# Patient Record
Sex: FEMALE | Race: OTHER | Employment: FULL TIME | URBAN - METROPOLITAN AREA
[De-identification: names, ages, dates, MRNs, and addresses within clinical notes are randomized per-mention and may not be internally consistent; named-entity substitution may affect disease eponyms.]

---

## 2017-01-28 RX ORDER — SIMVASTATIN 20 MG
TABLET ORAL
Qty: 30 TABLET | Refills: 0 | Status: SHIPPED | OUTPATIENT
Start: 2017-01-28 | End: 2017-03-03

## 2017-03-04 RX ORDER — SIMVASTATIN 20 MG
TABLET ORAL
Qty: 30 TABLET | Refills: 0 | Status: SHIPPED | OUTPATIENT
Start: 2017-03-04 | End: 2017-04-11

## 2017-03-23 ENCOUNTER — OFFICE VISIT (OUTPATIENT)
Dept: INTERNAL MEDICINE CLINIC | Facility: CLINIC | Age: 32
End: 2017-03-23

## 2017-03-23 VITALS
HEART RATE: 67 BPM | HEIGHT: 65 IN | BODY MASS INDEX: 34.16 KG/M2 | WEIGHT: 205 LBS | DIASTOLIC BLOOD PRESSURE: 86 MMHG | SYSTOLIC BLOOD PRESSURE: 137 MMHG

## 2017-03-23 DIAGNOSIS — R79.1 ABNORMAL INR: ICD-10-CM

## 2017-03-23 DIAGNOSIS — R51.9 BILATERAL HEADACHE: ICD-10-CM

## 2017-03-23 DIAGNOSIS — M25.511 ACUTE PAIN OF RIGHT SHOULDER: ICD-10-CM

## 2017-03-23 DIAGNOSIS — D68.59 ANTITHROMBIN III DEFICIENCY (HCC): ICD-10-CM

## 2017-03-23 DIAGNOSIS — G08 DURAL VENOUS SINUS THROMBOSIS: ICD-10-CM

## 2017-03-23 DIAGNOSIS — I63.00 CEREBROVASCULAR ACCIDENT (CVA) DUE TO THROMBOSIS OF PRECEREBRAL ARTERY (HCC): Primary | ICD-10-CM

## 2017-03-23 DIAGNOSIS — Z00.00 PREVENTATIVE HEALTH CARE: ICD-10-CM

## 2017-03-23 LAB — INR: 3.6 (ref 0.8–1.2)

## 2017-03-23 PROCEDURE — 36416 COLLJ CAPILLARY BLOOD SPEC: CPT | Performed by: INTERNAL MEDICINE

## 2017-03-23 PROCEDURE — 85610 PROTHROMBIN TIME: CPT | Performed by: INTERNAL MEDICINE

## 2017-03-23 PROCEDURE — 99214 OFFICE O/P EST MOD 30 MIN: CPT | Performed by: INTERNAL MEDICINE

## 2017-03-23 RX ORDER — MECLIZINE HCL 12.5 MG/1
12.5 TABLET ORAL
COMMUNITY
Start: 2016-01-07 | End: 2017-03-23

## 2017-03-24 NOTE — PROGRESS NOTES
HPI:    Patient ID: Eli Dominguez is a 32year old female. HPI  Patient comes in today for follow-up. She has been to [place] to her grandma's house after her father  last few months.   She says she has been checking her INR herself and her cousin w of age at time of stroke. Mother is currently 51 y/o. • Htn [Other] [OTHER] Paternal Grandmother    • DVT [Other] [OTHER] Paternal Aunt      unsure of age, also on her LEs.      • dvt [Other] [OTHER] Paternal Cousin Female 25     started after second  C dietitian  Bilateral headache-patient takes Topamax.   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

## 2017-03-27 ENCOUNTER — ANTI-COAG VISIT (OUTPATIENT)
Dept: INTERNAL MEDICINE CLINIC | Facility: CLINIC | Age: 32
End: 2017-03-27

## 2017-03-27 DIAGNOSIS — I63.00 CEREBROVASCULAR ACCIDENT (CVA) DUE TO THROMBOSIS OF PRECEREBRAL ARTERY (HCC): Primary | ICD-10-CM

## 2017-03-27 LAB — INR: 1.7 (ref 0.8–1.2)

## 2017-03-27 PROCEDURE — 36416 COLLJ CAPILLARY BLOOD SPEC: CPT | Performed by: INTERNAL MEDICINE

## 2017-03-27 PROCEDURE — 85610 PROTHROMBIN TIME: CPT | Performed by: INTERNAL MEDICINE

## 2017-04-04 ENCOUNTER — ANTI-COAG VISIT (OUTPATIENT)
Dept: INTERNAL MEDICINE CLINIC | Facility: CLINIC | Age: 32
End: 2017-04-04

## 2017-04-04 DIAGNOSIS — I63.89 CEREBROVASCULAR ACCIDENT (CVA) DUE TO OTHER MECHANISM (HCC): ICD-10-CM

## 2017-04-04 DIAGNOSIS — I63.39 CEREBROVASCULAR ACCIDENT (CVA) DUE TO THROMBOSIS OF OTHER CEREBRAL ARTERY (HCC): Primary | ICD-10-CM

## 2017-04-04 PROCEDURE — 85610 PROTHROMBIN TIME: CPT | Performed by: INTERNAL MEDICINE

## 2017-04-04 PROCEDURE — 36416 COLLJ CAPILLARY BLOOD SPEC: CPT | Performed by: INTERNAL MEDICINE

## 2017-04-04 NOTE — PROGRESS NOTES
Message left for pt with new coumadin dose as requested, pt informed needs to recheck level on 4-11-17 and requested pt to call and let us know she received this message.

## 2017-04-07 RX ORDER — WARFARIN SODIUM 1 MG/1
TABLET ORAL
Qty: 30 TABLET | Refills: 0 | Status: SHIPPED | OUTPATIENT
Start: 2017-04-07 | End: 2019-09-16

## 2017-04-10 RX ORDER — WARFARIN SODIUM 5 MG/1
TABLET ORAL
Qty: 30 TABLET | Refills: 1 | Status: SHIPPED | OUTPATIENT
Start: 2017-04-10 | End: 2017-07-19

## 2017-04-11 ENCOUNTER — ANTI-COAG VISIT (OUTPATIENT)
Dept: INTERNAL MEDICINE CLINIC | Facility: CLINIC | Age: 32
End: 2017-04-11

## 2017-04-11 DIAGNOSIS — I63.89 CEREBROVASCULAR ACCIDENT (CVA) DUE TO OTHER MECHANISM (HCC): Primary | ICD-10-CM

## 2017-04-11 PROCEDURE — 85610 PROTHROMBIN TIME: CPT | Performed by: INTERNAL MEDICINE

## 2017-04-11 PROCEDURE — 36416 COLLJ CAPILLARY BLOOD SPEC: CPT | Performed by: INTERNAL MEDICINE

## 2017-04-12 RX ORDER — SIMVASTATIN 20 MG
TABLET ORAL
Qty: 30 TABLET | Refills: 3 | Status: SHIPPED | OUTPATIENT
Start: 2017-04-12 | End: 2017-08-30

## 2017-04-12 NOTE — PROGRESS NOTES
Called patient and was routed to voicemail. Left message on voicemail for patient to return office phone call, and request to speak to Marty Christian.

## 2017-04-18 ENCOUNTER — ANTI-COAG VISIT (OUTPATIENT)
Dept: INTERNAL MEDICINE CLINIC | Facility: CLINIC | Age: 32
End: 2017-04-18

## 2017-04-18 DIAGNOSIS — I63.89 CEREBROVASCULAR ACCIDENT (CVA) DUE TO OTHER MECHANISM (HCC): Primary | ICD-10-CM

## 2017-04-18 PROCEDURE — 85610 PROTHROMBIN TIME: CPT | Performed by: INTERNAL MEDICINE

## 2017-04-18 PROCEDURE — 36416 COLLJ CAPILLARY BLOOD SPEC: CPT | Performed by: INTERNAL MEDICINE

## 2017-04-25 ENCOUNTER — ANTI-COAG VISIT (OUTPATIENT)
Dept: INTERNAL MEDICINE CLINIC | Facility: CLINIC | Age: 32
End: 2017-04-25

## 2017-04-25 DIAGNOSIS — I63.89 CEREBROVASCULAR ACCIDENT (CVA) DUE TO OTHER MECHANISM (HCC): Primary | ICD-10-CM

## 2017-04-25 PROCEDURE — 85610 PROTHROMBIN TIME: CPT | Performed by: INTERNAL MEDICINE

## 2017-04-25 PROCEDURE — 36416 COLLJ CAPILLARY BLOOD SPEC: CPT | Performed by: INTERNAL MEDICINE

## 2017-04-25 NOTE — PROGRESS NOTES
Left voice message for patient with dosing instructions and date of next INR testing. Left office number for patient to return call if any questions and to schedule next INR visit.

## 2017-05-12 RX ORDER — TOPIRAMATE 100 MG/1
TABLET, FILM COATED ORAL
Qty: 60 TABLET | Refills: 0 | OUTPATIENT
Start: 2017-05-12

## 2017-05-23 ENCOUNTER — ANTI-COAG VISIT (OUTPATIENT)
Dept: INTERNAL MEDICINE CLINIC | Facility: CLINIC | Age: 32
End: 2017-05-23

## 2017-05-23 DIAGNOSIS — I63.89 CEREBROVASCULAR ACCIDENT (CVA) DUE TO OTHER MECHANISM (HCC): Primary | ICD-10-CM

## 2017-05-23 PROCEDURE — 36416 COLLJ CAPILLARY BLOOD SPEC: CPT | Performed by: INTERNAL MEDICINE

## 2017-05-23 PROCEDURE — 85610 PROTHROMBIN TIME: CPT | Performed by: INTERNAL MEDICINE

## 2017-06-22 ENCOUNTER — OFFICE VISIT (OUTPATIENT)
Dept: INTERNAL MEDICINE CLINIC | Facility: CLINIC | Age: 32
End: 2017-06-22

## 2017-06-22 VITALS
HEART RATE: 71 BPM | SYSTOLIC BLOOD PRESSURE: 119 MMHG | BODY MASS INDEX: 33.82 KG/M2 | HEIGHT: 65 IN | DIASTOLIC BLOOD PRESSURE: 83 MMHG | WEIGHT: 203 LBS

## 2017-06-22 DIAGNOSIS — I63.89 CEREBROVASCULAR ACCIDENT (CVA) DUE TO OTHER MECHANISM (HCC): Primary | ICD-10-CM

## 2017-06-22 DIAGNOSIS — R23.8 EASY BRUISABILITY: ICD-10-CM

## 2017-06-22 DIAGNOSIS — E78.5 HYPERLIPIDEMIA, UNSPECIFIED HYPERLIPIDEMIA TYPE: ICD-10-CM

## 2017-06-22 DIAGNOSIS — R04.0 EPISTAXIS: ICD-10-CM

## 2017-06-22 DIAGNOSIS — R79.1 ELEVATED INR: ICD-10-CM

## 2017-06-22 DIAGNOSIS — G08 DURAL VENOUS SINUS THROMBOSIS: ICD-10-CM

## 2017-06-22 PROCEDURE — 85610 PROTHROMBIN TIME: CPT | Performed by: INTERNAL MEDICINE

## 2017-06-22 PROCEDURE — 36416 COLLJ CAPILLARY BLOOD SPEC: CPT | Performed by: INTERNAL MEDICINE

## 2017-06-22 PROCEDURE — 99214 OFFICE O/P EST MOD 30 MIN: CPT | Performed by: INTERNAL MEDICINE

## 2017-06-22 PROCEDURE — 36415 COLL VENOUS BLD VENIPUNCTURE: CPT | Performed by: INTERNAL MEDICINE

## 2017-06-22 RX ORDER — TOPIRAMATE 100 MG/1
100 TABLET, FILM COATED ORAL 2 TIMES DAILY
Qty: 60 TABLET | Refills: 2 | Status: SHIPPED | OUTPATIENT
Start: 2017-06-22 | End: 2020-02-18

## 2017-06-22 NOTE — PROGRESS NOTES
HPI:    Patient ID: Josh Cooper is a 32year old female. HPI   Pt comes in for follow up, pt has been having nose bleeds last week also easy bruising, pt also for INR check and its high. She has not followed wit ob or neuro.      Review of Systems   Co Status: Never Used                        Alcohol Use: Yes           0.0 oz/week       0 Standard drinks or equivalent per week       Comment: 1-2 drinks every other month on average.        PHYSICAL EXAM:    Physical Exam   Constitutional: She is oriented

## 2017-06-26 ENCOUNTER — TELEPHONE (OUTPATIENT)
Dept: INTERNAL MEDICINE CLINIC | Facility: CLINIC | Age: 32
End: 2017-06-26

## 2017-06-26 ENCOUNTER — ANTI-COAG VISIT (OUTPATIENT)
Dept: INTERNAL MEDICINE CLINIC | Facility: CLINIC | Age: 32
End: 2017-06-26

## 2017-06-26 DIAGNOSIS — I63.9 CEREBROVASCULAR ACCIDENT (CVA), UNSPECIFIED MECHANISM (HCC): Primary | ICD-10-CM

## 2017-06-26 DIAGNOSIS — I63.89 CEREBROVASCULAR ACCIDENT (CVA) DUE TO OTHER MECHANISM (HCC): ICD-10-CM

## 2017-06-26 PROCEDURE — 36416 COLLJ CAPILLARY BLOOD SPEC: CPT | Performed by: INTERNAL MEDICINE

## 2017-06-26 PROCEDURE — 85610 PROTHROMBIN TIME: CPT | Performed by: INTERNAL MEDICINE

## 2017-06-26 NOTE — TELEPHONE ENCOUNTER
Patient will be traveling via airplane mid July, asking if there are any medicines she should take for her headaches, any precautions?

## 2017-07-05 ENCOUNTER — ANTI-COAG VISIT (OUTPATIENT)
Dept: INTERNAL MEDICINE CLINIC | Facility: CLINIC | Age: 32
End: 2017-07-05

## 2017-07-05 DIAGNOSIS — I63.89 CEREBROVASCULAR ACCIDENT (CVA) DUE TO OTHER MECHANISM (HCC): ICD-10-CM

## 2017-07-05 LAB
INR: 1.4 (ref 0.8–1.2)
TEST STRIP LOT #: ABNORMAL NUMERIC

## 2017-07-05 PROCEDURE — 36416 COLLJ CAPILLARY BLOOD SPEC: CPT | Performed by: INTERNAL MEDICINE

## 2017-07-05 PROCEDURE — 85610 PROTHROMBIN TIME: CPT | Performed by: INTERNAL MEDICINE

## 2017-07-13 ENCOUNTER — ANTI-COAG VISIT (OUTPATIENT)
Dept: INTERNAL MEDICINE CLINIC | Facility: CLINIC | Age: 32
End: 2017-07-13

## 2017-07-13 DIAGNOSIS — I63.89 CEREBROVASCULAR ACCIDENT (CVA) DUE TO OTHER MECHANISM (HCC): ICD-10-CM

## 2017-07-13 LAB — INR: 1.3 (ref 0.8–1.2)

## 2017-07-13 PROCEDURE — 85610 PROTHROMBIN TIME: CPT

## 2017-07-13 PROCEDURE — 36416 COLLJ CAPILLARY BLOOD SPEC: CPT | Performed by: INTERNAL MEDICINE

## 2017-07-13 PROCEDURE — 36416 COLLJ CAPILLARY BLOOD SPEC: CPT

## 2017-07-14 NOTE — PROGRESS NOTES
Left detailed message (HIPPA verified), informed of Dr. Charlee Sosa message as shown below. Provided Coumadin Clinic number. Also left office number if any questions.      MD Mely Mariscal Yk Clinical Staff 15 hours ago (6:14 PM)      I dont th

## 2017-07-17 ENCOUNTER — TELEPHONE (OUTPATIENT)
Dept: INTERNAL MEDICINE CLINIC | Facility: CLINIC | Age: 32
End: 2017-07-17

## 2017-07-17 NOTE — TELEPHONE ENCOUNTER
Per patient will like to know since she has been taking 5mg thurs- Sunday if there is any instructions you can give her in the mean time. Per patient she work 7am-5:30pm and is not sure how soon she will be seen at the coumadin clinic.  Patient states since

## 2017-07-17 NOTE — TELEPHONE ENCOUNTER
Patient calling states regarding INR isntructions, informed patient detailed message was left last week Friday. Per patient never received call, per patient since she was not given instructions has been taking 5mg every day.  Informed patient per PCP should

## 2017-07-17 NOTE — TELEPHONE ENCOUNTER
I think it will be best to have the clinic manage her INR because we are having hard time getting the INR to where it needs to be

## 2017-07-18 NOTE — TELEPHONE ENCOUNTER
Patient scheduled appointment for tomorrow 7/19/17 at 5:15pm. Patient states has been taking 5 mg daily will like to know for tonight will you like her to take anything or hold until tomorrow?  Please advise

## 2017-07-18 NOTE — TELEPHONE ENCOUNTER
Can you send me her latest INR as a result so I can see the coumadin table to see what dose she was taking

## 2017-07-19 ENCOUNTER — ANTI-COAG VISIT (OUTPATIENT)
Dept: INTERNAL MEDICINE CLINIC | Facility: CLINIC | Age: 32
End: 2017-07-19

## 2017-07-19 DIAGNOSIS — I63.89 CEREBROVASCULAR ACCIDENT (CVA) DUE TO OTHER MECHANISM (HCC): ICD-10-CM

## 2017-07-19 LAB — INR: 1.8 (ref 0.8–1.2)

## 2017-07-19 PROCEDURE — 36416 COLLJ CAPILLARY BLOOD SPEC: CPT | Performed by: INTERNAL MEDICINE

## 2017-07-19 PROCEDURE — 85610 PROTHROMBIN TIME: CPT | Performed by: INTERNAL MEDICINE

## 2017-07-19 NOTE — PROGRESS NOTES
Coumadin dosing instructions were discussed with patient. Dosing instructions were read back to me accurately. Patient to return in  Two days for recheck of INR.    Appointment scheduled

## 2017-07-20 RX ORDER — WARFARIN SODIUM 5 MG/1
TABLET ORAL
Qty: 30 TABLET | Refills: 0 | Status: SHIPPED | OUTPATIENT
Start: 2017-07-20 | End: 2017-08-30

## 2017-07-21 ENCOUNTER — TELEPHONE (OUTPATIENT)
Dept: INTERNAL MEDICINE CLINIC | Facility: CLINIC | Age: 32
End: 2017-07-21

## 2017-07-21 DIAGNOSIS — D68.59 ANTITHROMBIN III DEFICIENCY (HCC): Primary | ICD-10-CM

## 2017-07-21 DIAGNOSIS — I63.89 CEREBROVASCULAR ACCIDENT (CVA) DUE TO OTHER MECHANISM (HCC): ICD-10-CM

## 2017-07-21 NOTE — TELEPHONE ENCOUNTER
Left message on voice mail if patient would come back for INR on Saturday, may go to coumadin clinic or may go to [facility]. Please call to let us know what she decided office number left on voice mail.

## 2017-07-21 NOTE — TELEPHONE ENCOUNTER
Informed Dr. Juan R Ramsey that Lewis Ordonezsins came during work hrs for INR. At that time Melisa Abdirashid was having problems with the INR machine and was trying to fix it. Tried to inform pt of the issue but she had left already.  Per Dr. Juan R Ramsey pt should continue with same dosage on

## 2017-07-21 NOTE — TELEPHONE ENCOUNTER
I called pt but no response , pt was supposed to get an INR today but never did, this is recurrent with pt .  Pt is very non complaint with treatment and INR checks and that is why I want her to follow at the clonic with the INR and not with us

## 2017-08-09 ENCOUNTER — TELEPHONE (OUTPATIENT)
Dept: INTERNAL MEDICINE CLINIC | Facility: CLINIC | Age: 32
End: 2017-08-09

## 2017-08-09 DIAGNOSIS — I63.00 CEREBROVASCULAR ACCIDENT (CVA) DUE TO THROMBOSIS OF PRECEREBRAL ARTERY (HCC): Primary | ICD-10-CM

## 2017-08-09 NOTE — TELEPHONE ENCOUNTER
Pt would like to schedule an appt to see the coumadin clinic. Pt states that she was referred by Dr. Link Mckeon.

## 2017-08-10 DIAGNOSIS — I63.9 CEREBROVASCULAR ACCIDENT (CVA), UNSPECIFIED MECHANISM (HCC): Primary | ICD-10-CM

## 2017-08-10 NOTE — TELEPHONE ENCOUNTER
Dr. Monica Nj the coumadin clinic states that they cannot take a lab order, it needs to go under orders or referral made out to [facility]. Please disregard previous order.

## 2017-08-11 ENCOUNTER — ANTI-COAG VISIT (OUTPATIENT)
Dept: INTERNAL MEDICINE CLINIC | Facility: CLINIC | Age: 32
End: 2017-08-11

## 2017-08-11 DIAGNOSIS — I63.00 CEREBROVASCULAR ACCIDENT (CVA) DUE TO THROMBOSIS OF PRECEREBRAL ARTERY (HCC): ICD-10-CM

## 2017-08-14 ENCOUNTER — ANTI-COAG VISIT (OUTPATIENT)
Dept: INTERNAL MEDICINE CLINIC | Facility: CLINIC | Age: 32
End: 2017-08-14

## 2017-08-14 ENCOUNTER — TELEPHONE (OUTPATIENT)
Dept: CARDIOLOGY CLINIC | Facility: CLINIC | Age: 32
End: 2017-08-14

## 2017-08-14 DIAGNOSIS — I63.00 CEREBROVASCULAR ACCIDENT (CVA) DUE TO THROMBOSIS OF PRECEREBRAL ARTERY (HCC): Primary | ICD-10-CM

## 2017-08-14 LAB — INR: 3.4 (ref 2–3)

## 2017-08-14 PROCEDURE — 99211 OFF/OP EST MAY X REQ PHY/QHP: CPT

## 2017-08-14 PROCEDURE — 85610 PROTHROMBIN TIME: CPT

## 2017-08-14 PROCEDURE — 36416 COLLJ CAPILLARY BLOOD SPEC: CPT

## 2017-08-14 NOTE — TELEPHONE ENCOUNTER
Anil Blair,    Pt here at the coumadin clinic today, She was taking 5 mg daily. She had INR 3.4. I gave her new instruction on taking 2.5 mg on Monday and continue 5 mg all other day, which is decreasing 7.1 % weekly. Return in 2 weeks.      Please adv

## 2017-08-30 ENCOUNTER — ANTI-COAG VISIT (OUTPATIENT)
Dept: INTERNAL MEDICINE CLINIC | Facility: CLINIC | Age: 32
End: 2017-08-30

## 2017-08-30 DIAGNOSIS — I63.00 CEREBROVASCULAR ACCIDENT (CVA) DUE TO THROMBOSIS OF PRECEREBRAL ARTERY (HCC): ICD-10-CM

## 2017-08-30 LAB — INR: 2.4 (ref 2–3)

## 2017-08-30 PROCEDURE — 85610 PROTHROMBIN TIME: CPT

## 2017-08-30 PROCEDURE — 36416 COLLJ CAPILLARY BLOOD SPEC: CPT

## 2017-08-31 RX ORDER — SIMVASTATIN 20 MG
TABLET ORAL
Qty: 30 TABLET | Refills: 1 | Status: SHIPPED | OUTPATIENT
Start: 2017-08-31 | End: 2018-03-11

## 2017-08-31 RX ORDER — WARFARIN SODIUM 5 MG/1
TABLET ORAL
Qty: 30 TABLET | Refills: 1 | Status: SHIPPED | OUTPATIENT
Start: 2017-08-31 | End: 2017-11-15

## 2017-09-28 ENCOUNTER — ANTI-COAG VISIT (OUTPATIENT)
Dept: INTERNAL MEDICINE CLINIC | Facility: CLINIC | Age: 32
End: 2017-09-28

## 2017-09-28 DIAGNOSIS — I63.00 CEREBROVASCULAR ACCIDENT (CVA) DUE TO THROMBOSIS OF PRECEREBRAL ARTERY (HCC): ICD-10-CM

## 2017-09-28 LAB — INR: 2.6 (ref 2–3)

## 2017-09-28 PROCEDURE — 85610 PROTHROMBIN TIME: CPT

## 2017-09-28 PROCEDURE — 36416 COLLJ CAPILLARY BLOOD SPEC: CPT

## 2017-10-25 ENCOUNTER — ANTI-COAG VISIT (OUTPATIENT)
Dept: INTERNAL MEDICINE CLINIC | Facility: CLINIC | Age: 32
End: 2017-10-25

## 2017-10-25 DIAGNOSIS — I63.00 CEREBROVASCULAR ACCIDENT (CVA) DUE TO THROMBOSIS OF PRECEREBRAL ARTERY (HCC): ICD-10-CM

## 2017-10-25 PROCEDURE — 85610 PROTHROMBIN TIME: CPT

## 2017-10-25 PROCEDURE — 99211 OFF/OP EST MAY X REQ PHY/QHP: CPT

## 2017-10-25 PROCEDURE — 36416 COLLJ CAPILLARY BLOOD SPEC: CPT

## 2017-11-15 RX ORDER — WARFARIN SODIUM 5 MG/1
TABLET ORAL
Qty: 30 TABLET | Refills: 1 | Status: SHIPPED | OUTPATIENT
Start: 2017-11-15 | End: 2018-01-27

## 2017-12-04 ENCOUNTER — TELEPHONE (OUTPATIENT)
Dept: FAMILY MEDICINE CLINIC | Facility: CLINIC | Age: 32
End: 2017-12-04

## 2017-12-04 DIAGNOSIS — I63.00 CEREBROVASCULAR ACCIDENT (CVA) DUE TO THROMBOSIS OF PRECEREBRAL ARTERY (HCC): Primary | ICD-10-CM

## 2017-12-05 ENCOUNTER — APPOINTMENT (OUTPATIENT)
Dept: LAB | Facility: HOSPITAL | Age: 32
End: 2017-12-05
Attending: INTERNAL MEDICINE
Payer: COMMERCIAL

## 2017-12-05 DIAGNOSIS — I63.89 CEREBROVASCULAR ACCIDENT (CVA) DUE TO OTHER MECHANISM (HCC): ICD-10-CM

## 2017-12-05 DIAGNOSIS — I63.9 CEREBROVASCULAR ACCIDENT (CVA), UNSPECIFIED MECHANISM (HCC): ICD-10-CM

## 2017-12-05 DIAGNOSIS — D68.59 ANTITHROMBIN III DEFICIENCY (HCC): ICD-10-CM

## 2017-12-05 PROCEDURE — 85610 PROTHROMBIN TIME: CPT

## 2017-12-05 PROCEDURE — 36415 COLL VENOUS BLD VENIPUNCTURE: CPT

## 2017-12-06 ENCOUNTER — TELEPHONE (OUTPATIENT)
Dept: INTERNAL MEDICINE CLINIC | Facility: CLINIC | Age: 32
End: 2017-12-06

## 2017-12-06 NOTE — TELEPHONE ENCOUNTER
PCP received INR result for patient. Patient is supposed to have her INR managed by coumadin clinic. Spoke with patient, informed to call coumadin clinic so she could receive instructions for her dosage.  Patient verbalized understanding, phone number given

## 2017-12-11 ENCOUNTER — TELEPHONE (OUTPATIENT)
Dept: INTERNAL MEDICINE CLINIC | Facility: CLINIC | Age: 32
End: 2017-12-11

## 2017-12-11 ENCOUNTER — ANTI-COAG VISIT (OUTPATIENT)
Dept: INTERNAL MEDICINE CLINIC | Facility: CLINIC | Age: 32
End: 2017-12-11

## 2017-12-11 DIAGNOSIS — I63.00 CEREBROVASCULAR ACCIDENT (CVA) DUE TO THROMBOSIS OF PRECEREBRAL ARTERY (HCC): ICD-10-CM

## 2017-12-11 NOTE — TELEPHONE ENCOUNTER
Pt calling states she had her INR done at the lab, pt requesting call back with orders. Pt requesting if no answer please leave detailed VM.

## 2017-12-19 ENCOUNTER — ANTI-COAG VISIT (OUTPATIENT)
Dept: INTERNAL MEDICINE CLINIC | Facility: CLINIC | Age: 32
End: 2017-12-19

## 2017-12-19 DIAGNOSIS — I63.00 CEREBROVASCULAR ACCIDENT (CVA) DUE TO THROMBOSIS OF PRECEREBRAL ARTERY (HCC): ICD-10-CM

## 2017-12-19 PROCEDURE — 85610 PROTHROMBIN TIME: CPT

## 2017-12-19 PROCEDURE — 36416 COLLJ CAPILLARY BLOOD SPEC: CPT

## 2018-01-16 ENCOUNTER — ANTI-COAG VISIT (OUTPATIENT)
Dept: INTERNAL MEDICINE CLINIC | Facility: CLINIC | Age: 33
End: 2018-01-16

## 2018-01-16 DIAGNOSIS — I63.00 CEREBROVASCULAR ACCIDENT (CVA) DUE TO THROMBOSIS OF PRECEREBRAL ARTERY (HCC): ICD-10-CM

## 2018-01-16 LAB — INR: 2.2 (ref 2–3)

## 2018-01-16 PROCEDURE — 85610 PROTHROMBIN TIME: CPT

## 2018-01-16 PROCEDURE — 93793 ANTICOAG MGMT PT WARFARIN: CPT

## 2018-01-23 ENCOUNTER — OFFICE VISIT (OUTPATIENT)
Dept: INTERNAL MEDICINE CLINIC | Facility: CLINIC | Age: 33
End: 2018-01-23

## 2018-01-23 VITALS
BODY MASS INDEX: 31.49 KG/M2 | HEIGHT: 65 IN | WEIGHT: 189 LBS | OXYGEN SATURATION: 95 % | HEART RATE: 87 BPM | TEMPERATURE: 100 F | SYSTOLIC BLOOD PRESSURE: 131 MMHG | DIASTOLIC BLOOD PRESSURE: 88 MMHG

## 2018-01-23 DIAGNOSIS — R68.89 FLU-LIKE SYMPTOMS: Primary | ICD-10-CM

## 2018-01-23 DIAGNOSIS — L60.8 DISCOLORATION AND THICKENING OF NAILS BOTH FEET: ICD-10-CM

## 2018-01-23 LAB
FLUAV + FLUBV RNA SPEC NAA+PROBE: NEGATIVE

## 2018-01-23 PROCEDURE — 99213 OFFICE O/P EST LOW 20 MIN: CPT | Performed by: INTERNAL MEDICINE

## 2018-01-23 PROCEDURE — 99212 OFFICE O/P EST SF 10 MIN: CPT | Performed by: INTERNAL MEDICINE

## 2018-01-23 NOTE — PROGRESS NOTES
HPI:    Patient ID: Han Wilhelm is a 28year old female. HPI  Pt comes in with complaint of fatigue body ache fever congestion cough never got a flu shot this year.   Patient also complains of bilateral big toenail color discoloration is been going on and vitals reviewed.              ASSESSMENT/PLAN:   Flu-like symptoms  (primary encounter diagnosis)- will test for flu, if + will treat , also drink plenty of fluid, rest and as need tylenol   Discoloration and thickening of nails both feet- this is most

## 2018-01-23 NOTE — PATIENT INSTRUCTIONS
ASSESSMENT/PLAN:   Flu-like symptoms  (primary encounter diagnosis)- will test for flu, if + will treat , also drink plenty of fluid, rest and as need tylenol   Discoloration and thickening of nails both feet- this is most likely to an old trauma with po

## 2018-01-29 RX ORDER — WARFARIN SODIUM 5 MG/1
TABLET ORAL
Qty: 45 TABLET | Refills: 0 | Status: SHIPPED | OUTPATIENT
Start: 2018-01-29 | End: 2018-04-09

## 2018-03-12 RX ORDER — SIMVASTATIN 20 MG
TABLET ORAL
Qty: 30 TABLET | Refills: 0 | Status: SHIPPED | OUTPATIENT
Start: 2018-03-12 | End: 2018-04-09

## 2018-04-10 RX ORDER — WARFARIN SODIUM 5 MG/1
TABLET ORAL
Qty: 45 TABLET | Refills: 0 | Status: SHIPPED | OUTPATIENT
Start: 2018-04-10 | End: 2018-06-14

## 2018-04-10 RX ORDER — SIMVASTATIN 20 MG
TABLET ORAL
Qty: 30 TABLET | Refills: 0 | Status: SHIPPED | OUTPATIENT
Start: 2018-04-10 | End: 2018-06-14

## 2018-06-15 RX ORDER — WARFARIN SODIUM 5 MG/1
TABLET ORAL
Qty: 45 TABLET | Refills: 0 | Status: SHIPPED | OUTPATIENT
Start: 2018-06-15 | End: 2018-08-13

## 2018-06-15 RX ORDER — SIMVASTATIN 20 MG
TABLET ORAL
Qty: 30 TABLET | Refills: 0 | Status: SHIPPED | OUTPATIENT
Start: 2018-06-15 | End: 2020-02-18

## 2018-07-30 ENCOUNTER — APPOINTMENT (OUTPATIENT)
Dept: HEMATOLOGY/ONCOLOGY | Facility: HOSPITAL | Age: 33
End: 2018-07-30
Attending: NURSE PRACTITIONER
Payer: COMMERCIAL

## 2018-07-31 ENCOUNTER — APPOINTMENT (OUTPATIENT)
Dept: HEMATOLOGY/ONCOLOGY | Facility: HOSPITAL | Age: 33
End: 2018-07-31
Attending: NURSE PRACTITIONER
Payer: COMMERCIAL

## 2018-08-13 RX ORDER — WARFARIN SODIUM 5 MG/1
TABLET ORAL
Qty: 45 TABLET | Refills: 0 | Status: SHIPPED | OUTPATIENT
Start: 2018-08-13 | End: 2019-09-16

## 2018-08-22 ENCOUNTER — TELEPHONE (OUTPATIENT)
Dept: HEMATOLOGY/ONCOLOGY | Facility: HOSPITAL | Age: 33
End: 2018-08-22

## 2018-08-22 ENCOUNTER — APPOINTMENT (OUTPATIENT)
Dept: HEMATOLOGY/ONCOLOGY | Facility: HOSPITAL | Age: 33
End: 2018-08-22
Payer: COMMERCIAL

## 2018-08-22 NOTE — TELEPHONE ENCOUNTER
I called to confirm Vanda's appointment for 8/23. She states \" I want to cancel this appointment and not reschedule at this time. \"

## 2018-08-23 ENCOUNTER — APPOINTMENT (OUTPATIENT)
Dept: HEMATOLOGY/ONCOLOGY | Facility: HOSPITAL | Age: 33
End: 2018-08-23
Attending: INTERNAL MEDICINE
Payer: COMMERCIAL

## 2019-06-12 ENCOUNTER — TELEPHONE (OUTPATIENT)
Dept: INTERNAL MEDICINE CLINIC | Facility: CLINIC | Age: 34
End: 2019-06-12

## 2019-06-12 PROBLEM — I63.00 CEREBROVASCULAR ACCIDENT (CVA) DUE TO THROMBOSIS OF PRECEREBRAL ARTERY (HCC): Status: RESOLVED | Noted: 2017-08-11 | Resolved: 2019-06-12

## 2019-08-27 ENCOUNTER — HOSPITAL ENCOUNTER (OUTPATIENT)
Age: 34
Discharge: ANOTHER HOSPITAL | End: 2019-08-27
Attending: EMERGENCY MEDICINE
Payer: COMMERCIAL

## 2019-08-27 ENCOUNTER — HOSPITAL ENCOUNTER (EMERGENCY)
Facility: HOSPITAL | Age: 34
Discharge: HOME | End: 2019-08-27
Attending: PHYSICIAN ASSISTANT
Payer: COMMERCIAL

## 2019-08-27 ENCOUNTER — APPOINTMENT (OUTPATIENT)
Dept: CT IMAGING | Facility: HOSPITAL | Age: 34
End: 2019-08-27
Attending: PHYSICIAN ASSISTANT
Payer: COMMERCIAL

## 2019-08-27 VITALS
RESPIRATION RATE: 17 BRPM | HEIGHT: 65 IN | TEMPERATURE: 98 F | WEIGHT: 165 LBS | BODY MASS INDEX: 27.49 KG/M2 | SYSTOLIC BLOOD PRESSURE: 123 MMHG | DIASTOLIC BLOOD PRESSURE: 75 MMHG | OXYGEN SATURATION: 98 % | HEART RATE: 66 BPM

## 2019-08-27 VITALS
HEIGHT: 65 IN | WEIGHT: 190 LBS | BODY MASS INDEX: 31.65 KG/M2 | TEMPERATURE: 98 F | RESPIRATION RATE: 18 BRPM | DIASTOLIC BLOOD PRESSURE: 87 MMHG | SYSTOLIC BLOOD PRESSURE: 125 MMHG | HEART RATE: 68 BPM | OXYGEN SATURATION: 100 %

## 2019-08-27 DIAGNOSIS — R51.9 NONINTRACTABLE HEADACHE, UNSPECIFIED CHRONICITY PATTERN, UNSPECIFIED HEADACHE TYPE: Primary | ICD-10-CM

## 2019-08-27 DIAGNOSIS — R19.7 DIARRHEA, UNSPECIFIED TYPE: ICD-10-CM

## 2019-08-27 DIAGNOSIS — R10.9 ABDOMINAL PAIN, LEFT LATERAL: ICD-10-CM

## 2019-08-27 DIAGNOSIS — R09.81 NASAL CONGESTION: ICD-10-CM

## 2019-08-27 DIAGNOSIS — R51.9 NEW ONSET HEADACHE: Primary | ICD-10-CM

## 2019-08-27 LAB
ANION GAP SERPL CALC-SCNC: 6 MMOL/L (ref 0–18)
APTT PPP: 19.3 SECONDS (ref 23.2–35.3)
B-HCG UR QL: NEGATIVE
BASOPHILS # BLD AUTO: 0.06 X10(3) UL (ref 0–0.2)
BASOPHILS NFR BLD AUTO: 0.7 %
BILIRUB UR QL: NEGATIVE
BUN BLD-MCNC: 5 MG/DL (ref 7–18)
BUN/CREAT SERPL: 6.6 (ref 10–20)
CALCIUM BLD-MCNC: 9.1 MG/DL (ref 8.5–10.1)
CHLORIDE SERPL-SCNC: 106 MMOL/L (ref 98–112)
CO2 SERPL-SCNC: 29 MMOL/L (ref 21–32)
COLOR UR: YELLOW
CREAT BLD-MCNC: 0.76 MG/DL (ref 0.55–1.02)
DEPRECATED RDW RBC AUTO: 45.6 FL (ref 35.1–46.3)
EOSINOPHIL # BLD AUTO: 0.44 X10(3) UL (ref 0–0.7)
EOSINOPHIL NFR BLD AUTO: 5 %
ERYTHROCYTE [DISTWIDTH] IN BLOOD BY AUTOMATED COUNT: 13.2 % (ref 11–15)
GLUCOSE BLD-MCNC: 97 MG/DL (ref 70–99)
GLUCOSE UR-MCNC: NEGATIVE MG/DL
HCT VFR BLD AUTO: 43.8 % (ref 35–48)
HGB BLD-MCNC: 14.5 G/DL (ref 12–16)
HGB UR QL STRIP.AUTO: NEGATIVE
IMM GRANULOCYTES # BLD AUTO: 0.02 X10(3) UL (ref 0–1)
IMM GRANULOCYTES NFR BLD: 0.2 %
INR BLD: 0.93 (ref 0.9–1.2)
KETONES UR-MCNC: NEGATIVE MG/DL
LEUKOCYTE ESTERASE UR QL STRIP.AUTO: NEGATIVE
LYMPHOCYTES # BLD AUTO: 2.13 X10(3) UL (ref 1–4)
LYMPHOCYTES NFR BLD AUTO: 24.3 %
MCH RBC QN AUTO: 31 PG (ref 26–34)
MCHC RBC AUTO-ENTMCNC: 33.1 G/DL (ref 31–37)
MCV RBC AUTO: 93.6 FL (ref 80–100)
MONOCYTES # BLD AUTO: 0.52 X10(3) UL (ref 0.1–1)
MONOCYTES NFR BLD AUTO: 5.9 %
NEUTROPHILS # BLD AUTO: 5.59 X10 (3) UL (ref 1.5–7.7)
NEUTROPHILS # BLD AUTO: 5.59 X10(3) UL (ref 1.5–7.7)
NEUTROPHILS NFR BLD AUTO: 63.9 %
NITRITE UR QL STRIP.AUTO: NEGATIVE
OSMOLALITY SERPL CALC.SUM OF ELEC: 289 MOSM/KG (ref 275–295)
PH UR: 8 [PH] (ref 5–8)
PLATELET # BLD AUTO: 263 10(3)UL (ref 150–450)
POTASSIUM SERPL-SCNC: 4 MMOL/L (ref 3.5–5.1)
PROT UR-MCNC: NEGATIVE MG/DL
PROTHROMBIN TIME: 12.3 SECONDS (ref 11.8–14.5)
RBC # BLD AUTO: 4.68 X10(6)UL (ref 3.8–5.3)
RBC #/AREA URNS AUTO: 1 /HPF
SODIUM SERPL-SCNC: 141 MMOL/L (ref 136–145)
SP GR UR STRIP: 1.02 (ref 1–1.03)
UROBILINOGEN UR STRIP-ACNC: <2
VIT C UR-MCNC: NEGATIVE MG/DL
WBC # BLD AUTO: 8.8 X10(3) UL (ref 4–11)
WBC #/AREA URNS AUTO: <1 /HPF

## 2019-08-27 PROCEDURE — 96375 TX/PRO/DX INJ NEW DRUG ADDON: CPT

## 2019-08-27 PROCEDURE — 85730 THROMBOPLASTIN TIME PARTIAL: CPT | Performed by: PHYSICIAN ASSISTANT

## 2019-08-27 PROCEDURE — 80048 BASIC METABOLIC PNL TOTAL CA: CPT | Performed by: PHYSICIAN ASSISTANT

## 2019-08-27 PROCEDURE — 70470 CT HEAD/BRAIN W/O & W/DYE: CPT | Performed by: PHYSICIAN ASSISTANT

## 2019-08-27 PROCEDURE — 85610 PROTHROMBIN TIME: CPT | Performed by: PHYSICIAN ASSISTANT

## 2019-08-27 PROCEDURE — 99213 OFFICE O/P EST LOW 20 MIN: CPT

## 2019-08-27 PROCEDURE — 99284 EMERGENCY DEPT VISIT MOD MDM: CPT

## 2019-08-27 PROCEDURE — 85025 COMPLETE CBC W/AUTO DIFF WBC: CPT | Performed by: PHYSICIAN ASSISTANT

## 2019-08-27 PROCEDURE — 96374 THER/PROPH/DIAG INJ IV PUSH: CPT

## 2019-08-27 PROCEDURE — 81025 URINE PREGNANCY TEST: CPT

## 2019-08-27 PROCEDURE — 99212 OFFICE O/P EST SF 10 MIN: CPT

## 2019-08-27 PROCEDURE — 96361 HYDRATE IV INFUSION ADD-ON: CPT

## 2019-08-27 PROCEDURE — 81003 URINALYSIS AUTO W/O SCOPE: CPT | Performed by: PHYSICIAN ASSISTANT

## 2019-08-27 RX ORDER — DEXAMETHASONE SODIUM PHOSPHATE 4 MG/ML
10 VIAL (ML) INJECTION ONCE
Status: COMPLETED | OUTPATIENT
Start: 2019-08-27 | End: 2019-08-27

## 2019-08-27 RX ORDER — ONDANSETRON 4 MG/1
4 TABLET, ORALLY DISINTEGRATING ORAL EVERY 6 HOURS PRN
Qty: 10 TABLET | Refills: 0 | Status: SHIPPED | OUTPATIENT
Start: 2019-08-27 | End: 2019-08-30

## 2019-08-27 RX ORDER — KETOROLAC TROMETHAMINE 30 MG/ML
30 INJECTION, SOLUTION INTRAMUSCULAR; INTRAVENOUS ONCE
Status: COMPLETED | OUTPATIENT
Start: 2019-08-27 | End: 2019-08-27

## 2019-08-27 RX ORDER — FLUTICASONE PROPIONATE 50 MCG
2 SPRAY, SUSPENSION (ML) NASAL DAILY
Qty: 16 G | Refills: 0 | Status: SHIPPED | OUTPATIENT
Start: 2019-08-27 | End: 2019-09-26

## 2019-08-27 RX ORDER — ONDANSETRON 2 MG/ML
4 INJECTION INTRAMUSCULAR; INTRAVENOUS ONCE
Status: COMPLETED | OUTPATIENT
Start: 2019-08-27 | End: 2019-08-27

## 2019-08-27 NOTE — ED PROVIDER NOTES
Patient Seen in: [facility] CLINICS Immediate Care In [address] [place] [address]    History   Patient presents with:  Headache (neurologic)    Stated Complaint: diarrhea,congestion    HPI    Patient complains of headaches for the past 2 months and on and off diarrhea and a Unsure of age at time of stroke. Mother is currently 53 y/o. • Other (Htn [Other]) Paternal Grandmother    • Other (DVT [Other]) Paternal Aunt         unsure of age, also on her LEs.      • Other (dvt [Other]) Paternal Cousin Female 22        sta No data to display    MDM       Cardiac Monitor: Pulse Readings from Last 1 Encounters:  08/27/19 : 68  , ,      Radiology findings:       Procedures:      Critical Care:        MDM   Given her history and her discontinuation of meds and new onset of a hea

## 2019-08-27 NOTE — ED INITIAL ASSESSMENT (HPI)
C/o nasal congestion and headaches for 2 months  C/o on and off diarrhea and abd pain for 1 1/2 months  Claims that she is taking care of her aunt who has C-diff a month ago

## 2019-08-27 NOTE — ED PROVIDER NOTES
Patient Seen in: [facility] AND [facility] Emergency Department    History   Patient presents with:  Headache (neurologic)  Nausea/Vomiting/Diarrhea (gastrointestinal)    Stated Complaint: from [place]    HPI    Mariama Castro is a 29year old female who presents COUMADIN CLINIC   SIMVASTATIN 20 MG Oral Tab,  TAKE 1 TABLET BY MOUTH EVERY NIGHT. topiramate 100 MG Oral Tab,  Take 1 tablet (100 mg total) by mouth 2 (two) times daily. WARFARIN SODIUM 1 MG Oral Tab,  TAKE 1 TABLET BY MOUTH NIGHTLY.  SEE INSTRUCTIONS abnormalities of mood, affect. Head: Normocephalic/atraumatic. Nontender. Eyes: Pupils are equal round reactive to light. Conjunctiva are within normal limits. Extraocular motions intact bilaterally. ENT: Oropharynx is clear.   TMs within normal limit components:    PTT 19.3 (*)     All other components within normal limits   PROTHROMBIN TIME (PT) - Normal   EMH POCT PREGNANCY URINE - Normal   CBC WITH DIFFERENTIAL WITH PLATELET    Narrative:      The following orders were created for panel order CBC WIT [facility]  [place] [address] [phone]    Schedule an appointment as soon as possible for a visit in 2 days  For follow-up    Alvina Millan, [phone]    Schedule an appointment as soon as possible for a visit

## 2019-08-27 NOTE — ED NOTES
Pt to ER from [address] [place] [address] with multiple complaints. Pt states she has been having a worsening frontal headache for 2 months. Pt states intermittent visual changes. Pt states she wears contacts. Pt denies fevers at home. Pt c/o nasal congestion. Pt denies cough.  Pt

## 2019-08-27 NOTE — ED INITIAL ASSESSMENT (HPI)
Referred to ER from [facility] for further eval. C/o L sided abdominal pain everyday for 2 months with intermittent bouts of diarrhea. 4 loose foul-smelling stools today.  Ernesto Moreno is caring for her Aunt who is terminally ill - her Walt Score was recently diagnose

## 2019-08-28 ENCOUNTER — OFFICE VISIT (OUTPATIENT)
Dept: INTERNAL MEDICINE CLINIC | Facility: CLINIC | Age: 34
End: 2019-08-28
Payer: COMMERCIAL

## 2019-08-28 VITALS
HEIGHT: 65 IN | BODY MASS INDEX: 28.99 KG/M2 | SYSTOLIC BLOOD PRESSURE: 117 MMHG | DIASTOLIC BLOOD PRESSURE: 77 MMHG | RESPIRATION RATE: 18 BRPM | HEART RATE: 85 BPM | TEMPERATURE: 99 F | OXYGEN SATURATION: 99 % | WEIGHT: 174 LBS

## 2019-08-28 DIAGNOSIS — Z09 ENCOUNTER FOR EXAMINATION FOLLOWING TREATMENT AT HOSPITAL: Primary | ICD-10-CM

## 2019-08-28 DIAGNOSIS — R10.84 GENERALIZED ABDOMINAL PAIN: ICD-10-CM

## 2019-08-28 DIAGNOSIS — I63.9 CEREBROVASCULAR ACCIDENT (CVA), UNSPECIFIED MECHANISM (HCC): ICD-10-CM

## 2019-08-28 DIAGNOSIS — G08 THROMBOSIS TRANSVERSE SINUS: ICD-10-CM

## 2019-08-28 DIAGNOSIS — R51.9 BILATERAL HEADACHE: ICD-10-CM

## 2019-08-28 PROCEDURE — 99214 OFFICE O/P EST MOD 30 MIN: CPT | Performed by: INTERNAL MEDICINE

## 2019-08-28 RX ORDER — DICYCLOMINE HYDROCHLORIDE 10 MG/1
10 CAPSULE ORAL 4 TIMES DAILY
Qty: 40 CAPSULE | Refills: 3 | Status: SHIPPED | OUTPATIENT
Start: 2019-08-28 | End: 2019-09-07

## 2019-08-28 NOTE — PROGRESS NOTES
HPI:    Patient ID: Chapo Garcia is a 29year old female.     HPI  She comes in for follow-up after she was seen in ER for headaches and abdominal pain patient has stopped taking the Coumadin has not taken out for a while she says she is been getting more treatment      Past Surgical History:   Procedure Laterality Date   • APPENDECTOMY  approx. 2006   • LIPOMA REMOVAL        Family History   Problem Relation Age of Onset   • Diabetes Father    • Stroke Mother         Deon Maynard of age at time of stroke.   Dar abdominal pain with irritable bowel syndrome we will give Bentyl will follow with GI  Noncompliant with medical treatment she has been taking Coumadin long time. No orders of the defined types were placed in this encounter.       Meds This Visit:  Reques

## 2019-08-29 ENCOUNTER — OFFICE VISIT (OUTPATIENT)
Dept: OTOLARYNGOLOGY | Facility: CLINIC | Age: 34
End: 2019-08-29
Payer: COMMERCIAL

## 2019-08-29 VITALS
SYSTOLIC BLOOD PRESSURE: 119 MMHG | DIASTOLIC BLOOD PRESSURE: 80 MMHG | WEIGHT: 173 LBS | BODY MASS INDEX: 28.82 KG/M2 | HEIGHT: 65 IN | TEMPERATURE: 98 F

## 2019-08-29 DIAGNOSIS — J30.89 NON-SEASONAL ALLERGIC RHINITIS, UNSPECIFIED TRIGGER: Primary | ICD-10-CM

## 2019-08-29 PROCEDURE — 99243 OFF/OP CNSLTJ NEW/EST LOW 30: CPT | Performed by: OTOLARYNGOLOGY

## 2019-08-30 NOTE — PROGRESS NOTES
Em Severino is a 29year old female. Patient presents with:  Nose Problem: nasal congestion for several years   Headache: daily     HPI:   She has been experiencing problems with recurrent sinus congestion and nasal pressure.   She had a stroke in the pas month on average.     Drug use: Not Currently       REVIEW OF SYSTEMS:   GENERAL HEALTH: feels well otherwise  GENERAL : denies fever, chills, sweats, weight loss, weight gain  SKIN: denies any unusual skin lesions or rashes  RESPIRATORY: denies shortness o MD  8/30/2019  6:23 AM

## 2019-09-05 NOTE — H&P
[address] [address] Gastroenterology                                                                                                  Clinic History and Physical     Pa issues with sedation.  - No known history of sleep apnea. Pertinent Family Hx:  + liver cirrhosis, maternal grandfather   - No family hx of esophageal, gastric or colon cancer  - No family history of IBD.     Prior endoscopies:  March 2016 EGD/colonoscop 2 sprays by Nasal route daily.  Disp: 16 g Rfl: 0   WARFARIN SODIUM 5 MG Oral Tab TAKE 1 AND 1/2 TABLETS BY MOUTH ON MONDAY, 1 TABLET ON THE REST OF THE WEEK AS DIRECTED BY THE COUMADIN CLINIC Disp: 45 tablet Rfl: 0   SIMVASTATIN 20 MG Oral Tab TAKE 1 TABLE extremities   Psych: Pt has a normal mood and affect, behavior is normal    Nursing note and vitals reviewed    Labs/Imaging:     Patient's labs and imaging were reviewed and discussed with patient today. See HPI and A&P for further details.      .  ASSESS above      Orders This Visit:  Orders Placed This Encounter      Immunoglobulin A, Qn, Serum (IGA)      Tissue Transglutaminase Ab, IgA      TSH (Assay, Thyroid Stim Hormone)      Lipase      Hepatic Function Panel (7)      C. diff toxigenic PCR (OPT)

## 2019-09-12 ENCOUNTER — OFFICE VISIT (OUTPATIENT)
Dept: GASTROENTEROLOGY | Facility: CLINIC | Age: 34
End: 2019-09-12
Payer: COMMERCIAL

## 2019-09-12 VITALS
WEIGHT: 175 LBS | HEART RATE: 76 BPM | SYSTOLIC BLOOD PRESSURE: 122 MMHG | HEIGHT: 65 IN | DIASTOLIC BLOOD PRESSURE: 82 MMHG | BODY MASS INDEX: 29.16 KG/M2

## 2019-09-12 DIAGNOSIS — R19.8 IRREGULAR BOWEL HABITS: ICD-10-CM

## 2019-09-12 DIAGNOSIS — R10.84 GENERALIZED ABDOMINAL PAIN: Primary | ICD-10-CM

## 2019-09-12 PROCEDURE — 99243 OFF/OP CNSLTJ NEW/EST LOW 30: CPT | Performed by: NURSE PRACTITIONER

## 2019-09-12 NOTE — PATIENT INSTRUCTIONS
1.  Complete blood work and stool testing  2. Complete CT scan  3.   Follow-up depending on lab findings

## 2019-09-16 ENCOUNTER — OFFICE VISIT (OUTPATIENT)
Dept: HEMATOLOGY/ONCOLOGY | Facility: HOSPITAL | Age: 34
End: 2019-09-16
Attending: INTERNAL MEDICINE
Payer: COMMERCIAL

## 2019-09-16 ENCOUNTER — TELEPHONE (OUTPATIENT)
Dept: INTERNAL MEDICINE CLINIC | Facility: CLINIC | Age: 34
End: 2019-09-16

## 2019-09-16 ENCOUNTER — LAB ENCOUNTER (OUTPATIENT)
Dept: LAB | Facility: HOSPITAL | Age: 34
End: 2019-09-16
Attending: NURSE PRACTITIONER
Payer: COMMERCIAL

## 2019-09-16 VITALS
WEIGHT: 175 LBS | OXYGEN SATURATION: 100 % | RESPIRATION RATE: 16 BRPM | SYSTOLIC BLOOD PRESSURE: 118 MMHG | HEART RATE: 69 BPM | BODY MASS INDEX: 29.16 KG/M2 | HEIGHT: 65 IN | TEMPERATURE: 98 F | DIASTOLIC BLOOD PRESSURE: 71 MMHG

## 2019-09-16 DIAGNOSIS — Z51.81 ENCOUNTER FOR THERAPEUTIC DRUG MONITORING: Primary | ICD-10-CM

## 2019-09-16 DIAGNOSIS — R10.84 GENERALIZED ABDOMINAL PAIN: ICD-10-CM

## 2019-09-16 DIAGNOSIS — D68.59 HYPERCOAGULABLE STATE (HCC): Primary | ICD-10-CM

## 2019-09-16 DIAGNOSIS — Z79.01 LONG TERM (CURRENT) USE OF ANTICOAGULANTS: ICD-10-CM

## 2019-09-16 DIAGNOSIS — D68.59 HYPERCOAGULABLE STATE (HCC): ICD-10-CM

## 2019-09-16 DIAGNOSIS — R19.8 IRREGULAR BOWEL HABITS: ICD-10-CM

## 2019-09-16 LAB
ALBUMIN SERPL-MCNC: 3.5 G/DL (ref 3.4–5)
ALP LIVER SERPL-CCNC: 70 U/L (ref 37–98)
ALT SERPL-CCNC: 17 U/L (ref 13–56)
AST SERPL-CCNC: 16 U/L (ref 15–37)
BILIRUB DIRECT SERPL-MCNC: 0.1 MG/DL (ref 0–0.2)
BILIRUB SERPL-MCNC: 0.4 MG/DL (ref 0.1–2)
IGA SERPL-MCNC: 250 MG/DL (ref 70–312)
LIPASE SERPL-CCNC: 93 U/L (ref 73–393)
M PROTEIN MFR SERPL ELPH: 7 G/DL (ref 6.4–8.2)
TSI SER-ACNC: 2.79 MIU/ML (ref 0.36–3.74)

## 2019-09-16 PROCEDURE — 86146 BETA-2 GLYCOPROTEIN ANTIBODY: CPT

## 2019-09-16 PROCEDURE — 84443 ASSAY THYROID STIM HORMONE: CPT

## 2019-09-16 PROCEDURE — 85613 RUSSELL VIPER VENOM DILUTED: CPT

## 2019-09-16 PROCEDURE — 85390 FIBRINOLYSINS SCREEN I&R: CPT

## 2019-09-16 PROCEDURE — 85598 HEXAGNAL PHOSPH PLTLT NEUTRL: CPT

## 2019-09-16 PROCEDURE — 83516 IMMUNOASSAY NONANTIBODY: CPT

## 2019-09-16 PROCEDURE — 80076 HEPATIC FUNCTION PANEL: CPT

## 2019-09-16 PROCEDURE — 99214 OFFICE O/P EST MOD 30 MIN: CPT | Performed by: INTERNAL MEDICINE

## 2019-09-16 PROCEDURE — 85610 PROTHROMBIN TIME: CPT

## 2019-09-16 PROCEDURE — 82784 ASSAY IGA/IGD/IGG/IGM EACH: CPT | Performed by: NURSE PRACTITIONER

## 2019-09-16 PROCEDURE — 85730 THROMBOPLASTIN TIME PARTIAL: CPT

## 2019-09-16 PROCEDURE — 36415 COLL VENOUS BLD VENIPUNCTURE: CPT

## 2019-09-16 PROCEDURE — 86147 CARDIOLIPIN ANTIBODY EA IG: CPT

## 2019-09-16 PROCEDURE — 83690 ASSAY OF LIPASE: CPT

## 2019-09-16 RX ORDER — WARFARIN SODIUM 5 MG/1
TABLET ORAL
Qty: 45 TABLET | Refills: 0 | Status: SHIPPED | OUTPATIENT
Start: 2019-09-16 | End: 2019-11-01

## 2019-09-16 RX ORDER — WARFARIN SODIUM 1 MG/1
TABLET ORAL
Qty: 30 TABLET | Refills: 0 | Status: SHIPPED | OUTPATIENT
Start: 2019-09-16 | End: 2019-11-01

## 2019-09-16 RX ORDER — ENOXAPARIN SODIUM 100 MG/ML
1 INJECTION SUBCUTANEOUS 2 TIMES DAILY
Qty: 20 SYRINGE | Refills: 0 | Status: SHIPPED | OUTPATIENT
Start: 2019-09-16 | End: 2020-08-11

## 2019-09-16 NOTE — PROGRESS NOTES
ROYAL     Chapo Garcia is a 29year old female here for f/u of a Hypercoagulable state (hcc)  (primary encounter diagnosis)   Patient last seen in 2016. Seen recently in ER with c/o headaches. Hx CVA. Has not been taking coumadin for some time.    First tablet Rfl: 2   WARFARIN SODIUM 5 MG Oral Tab TAKE 1 AND 1/2 TABLETS BY MOUTH ON MONDAY, 1 TABLET ON THE REST OF THE WEEK AS DIRECTED BY THE COUMADIN CLINIC Disp: 45 tablet Rfl: 0   WARFARIN SODIUM 1 MG Oral Tab TAKE 1 TABLET BY MOUTH NIGHTLY.  SEE INSTRUCT rhythm. Abdomen: Soft, non tender with good bowel sounds. Extremities: No edema.           ASSESSMENT/PLAN:   Hypercoagulable state (hcc)  (primary encounter diagnosis)     Discussed with the patient that given the evidence of her MRI of prior CVA, I tiera to stop coumadin 3-4 days prior to procedure and start on lovenox bridge at the time and then through out pregnancy. Dose of lovenox should be therapeutic dose of 1 mg/kg sc bid.   Prescription sent today for this and coumadin and she needs to set up eric

## 2019-09-16 NOTE — TELEPHONE ENCOUNTER
Dr. Clarissa Pitt is requetsing patient to go back on warfarin and wants her monitored.  Order pended, please complete instructions and sign    History of:   Lupus  Hypercoagulable state  DVT  CVA QTc 470

## 2019-09-18 LAB
APTT PPP: 25.2 SECONDS (ref 23.2–35.3)
CONFIRM APTT STACLOT: NEGATIVE
CONFIRM DRVVT: 0.9 S (ref 0–1.1)
PROTHROMBIN TIME: 12.5 SECONDS (ref 11.8–14.5)
TTG IGA SER-ACNC: 0.5 U/ML (ref ?–7)

## 2019-09-19 ENCOUNTER — ANTI-COAG VISIT (OUTPATIENT)
Dept: INTERNAL MEDICINE CLINIC | Facility: CLINIC | Age: 34
End: 2019-09-19
Payer: COMMERCIAL

## 2019-09-19 DIAGNOSIS — G08 DURAL VENOUS SINUS THROMBOSIS: ICD-10-CM

## 2019-09-19 DIAGNOSIS — Z79.01 LONG TERM (CURRENT) USE OF ANTICOAGULANTS: ICD-10-CM

## 2019-09-19 DIAGNOSIS — Z51.81 ENCOUNTER FOR THERAPEUTIC DRUG MONITORING: ICD-10-CM

## 2019-09-19 DIAGNOSIS — G08 THROMBOSIS TRANSVERSE SINUS: ICD-10-CM

## 2019-09-19 DIAGNOSIS — D68.59 HYPERCOAGULABLE STATE (HCC): ICD-10-CM

## 2019-09-19 DIAGNOSIS — I63.019 CEREBROVASCULAR ACCIDENT (CVA) DUE TO THROMBOSIS OF VERTEBRAL ARTERY, UNSPECIFIED BLOOD VESSEL LATERALITY (HCC): ICD-10-CM

## 2019-09-19 LAB
CARDIOLIPIN IGG SERPL-ACNC: 1.5 GPL (ref 0–14.9)
CARDIOLIPIN IGM SERPL-ACNC: 5 MPL (ref 0–12.4)
INR: 1.7 (ref 2–3)

## 2019-09-19 PROCEDURE — 36416 COLLJ CAPILLARY BLOOD SPEC: CPT

## 2019-09-19 PROCEDURE — 85610 PROTHROMBIN TIME: CPT

## 2019-09-19 PROCEDURE — 93793 ANTICOAG MGMT PT WARFARIN: CPT

## 2019-09-20 LAB
BETA 2 GLYCOPROTEIN 1 AB, IGG: 6.4 U/ML (ref ?–15)
BETA 2 GLYCOPROTEIN 1 AB, IGM: 6.1 U/ML (ref ?–15)

## 2019-09-23 ENCOUNTER — TELEPHONE (OUTPATIENT)
Dept: ALLERGY | Facility: CLINIC | Age: 34
End: 2019-09-23

## 2019-09-23 ENCOUNTER — OFFICE VISIT (OUTPATIENT)
Dept: ALLERGY | Facility: CLINIC | Age: 34
End: 2019-09-23
Payer: COMMERCIAL

## 2019-09-23 ENCOUNTER — ANTI-COAG VISIT (OUTPATIENT)
Dept: INTERNAL MEDICINE CLINIC | Facility: CLINIC | Age: 34
End: 2019-09-23
Payer: COMMERCIAL

## 2019-09-23 ENCOUNTER — NURSE ONLY (OUTPATIENT)
Dept: ALLERGY | Facility: CLINIC | Age: 34
End: 2019-09-23
Payer: COMMERCIAL

## 2019-09-23 VITALS
HEART RATE: 83 BPM | TEMPERATURE: 98 F | RESPIRATION RATE: 18 BRPM | DIASTOLIC BLOOD PRESSURE: 79 MMHG | OXYGEN SATURATION: 98 % | SYSTOLIC BLOOD PRESSURE: 112 MMHG

## 2019-09-23 DIAGNOSIS — Z79.01 LONG TERM (CURRENT) USE OF ANTICOAGULANTS: ICD-10-CM

## 2019-09-23 DIAGNOSIS — J30.89 PERENNIAL ALLERGIC RHINITIS WITH SEASONAL VARIATION: Primary | ICD-10-CM

## 2019-09-23 DIAGNOSIS — Z51.81 ENCOUNTER FOR THERAPEUTIC DRUG MONITORING: ICD-10-CM

## 2019-09-23 DIAGNOSIS — G08 DURAL VENOUS SINUS THROMBOSIS: ICD-10-CM

## 2019-09-23 DIAGNOSIS — G08 THROMBOSIS TRANSVERSE SINUS: ICD-10-CM

## 2019-09-23 DIAGNOSIS — I63.019 CEREBROVASCULAR ACCIDENT (CVA) DUE TO THROMBOSIS OF VERTEBRAL ARTERY, UNSPECIFIED BLOOD VESSEL LATERALITY (HCC): ICD-10-CM

## 2019-09-23 DIAGNOSIS — J30.89 ENVIRONMENTAL AND SEASONAL ALLERGIES: ICD-10-CM

## 2019-09-23 DIAGNOSIS — J30.2 PERENNIAL ALLERGIC RHINITIS WITH SEASONAL VARIATION: Primary | ICD-10-CM

## 2019-09-23 DIAGNOSIS — D68.59 HYPERCOAGULABLE STATE (HCC): ICD-10-CM

## 2019-09-23 LAB — INR: 3.3 (ref 0.8–1.2)

## 2019-09-23 PROCEDURE — 85610 PROTHROMBIN TIME: CPT

## 2019-09-23 PROCEDURE — 95004 PERQ TESTS W/ALRGNC XTRCS: CPT | Performed by: ALLERGY & IMMUNOLOGY

## 2019-09-23 PROCEDURE — 95024 IQ TESTS W/ALLERGENIC XTRCS: CPT | Performed by: ALLERGY & IMMUNOLOGY

## 2019-09-23 PROCEDURE — 36416 COLLJ CAPILLARY BLOOD SPEC: CPT

## 2019-09-23 PROCEDURE — 93793 ANTICOAG MGMT PT WARFARIN: CPT

## 2019-09-23 PROCEDURE — 99244 OFF/OP CNSLTJ NEW/EST MOD 40: CPT | Performed by: ALLERGY & IMMUNOLOGY

## 2019-09-23 RX ORDER — LEVOCETIRIZINE DIHYDROCHLORIDE 5 MG/1
5 TABLET, FILM COATED ORAL NIGHTLY
Qty: 30 TABLET | Refills: 0 | Status: SHIPPED | OUTPATIENT
Start: 2019-09-23 | End: 2021-02-22

## 2019-09-23 RX ORDER — MONTELUKAST SODIUM 10 MG/1
10 TABLET ORAL NIGHTLY
Qty: 30 TABLET | Refills: 0 | Status: SHIPPED | OUTPATIENT
Start: 2019-09-23 | End: 2019-10-07

## 2019-09-23 NOTE — PATIENT INSTRUCTIONS
1. AR  Handouts on allergies and avoidance measures provided and reviewed including the potential treatment option of immunotherapy. Treatment plan reviewed.     Advised to use Flonase 2 sprays per nostril on a regular basis every day and may take up a we

## 2019-09-23 NOTE — PROGRESS NOTES
Jose Martin Nuñez is a 29year old female. HPI:   Patient presents with: Allergies: Patient reports that everyday she has nasal congestion especially at night, watery eyes, coughing and sneezing. Also has post nasal drip.      Patient is a 75-year-old femal Grandmother    • Other (DVT) Paternal Aunt         unsure of age, also on her LEs. • Other (dvt) Paternal Cousin Female 25        started after second  C section on the LE, and then had after 3rd C section. Had on miscarriage not sure what trimester. dysuria and hematuria  Hema/Lymph:  Negative for easy bleeding and easy bruising  Integumentary:  Negative for pruritus and rash  Musculoskeletal:  Negative for joint symptoms  Neurological:  Negative for dizziness, seizures  Psychiatric:  Negative for javon regular basis every day and may take up a week to take full effect add Singulair, montelukast 10 mg once a day in 1 week if refractory  Consider adding Xyzal 5 mg once night at bedtime as an antihistamine if refractory to Singulair  May add  sinus rinses a

## 2019-09-24 NOTE — TELEPHONE ENCOUNTER
Yes please proceed with prior authorization.   If not covered then have patient buy  xyzal over-the-counter

## 2019-09-24 NOTE — TELEPHONE ENCOUNTER
PA request for Xyzal received via fax. Prior medications include Claritin, Allergra, and Zyrtec. Jerry:  [id number]    Dr. John Farrell, would you like for us to proceed with this one? Thank you.

## 2019-09-24 NOTE — TELEPHONE ENCOUNTER
PA paper form filled out and signed by Dr. Ashlee Liang. Form faxed with OV from 9/23/19 to [facility] at [phone] with verification received of successful transmission. Will await response.

## 2019-09-26 NOTE — TELEPHONE ENCOUNTER
Response of PA request received via fax from [facility] . . . \"Levocetirizine is a product that is available OTC without a prescription. OTC products are excluded from your pharmacy benefit plan\".     Covered oral antihistamine Deslor

## 2019-09-26 NOTE — TELEPHONE ENCOUNTER
Message left on pt's mobile voicemail informing her that insurance denied prior auth for Xyzal due to the medication being available OTC. Pt informed to purchase xyzal (Levocetrizine) 5 mg tab, take one tab prior to bed nightly.      Pt asked to call th

## 2019-10-07 ENCOUNTER — OFFICE VISIT (OUTPATIENT)
Dept: ALLERGY | Facility: CLINIC | Age: 34
End: 2019-10-07
Payer: COMMERCIAL

## 2019-10-07 ENCOUNTER — ANTI-COAG VISIT (OUTPATIENT)
Dept: INTERNAL MEDICINE CLINIC | Facility: CLINIC | Age: 34
End: 2019-10-07
Payer: COMMERCIAL

## 2019-10-07 VITALS
RESPIRATION RATE: 18 BRPM | OXYGEN SATURATION: 98 % | SYSTOLIC BLOOD PRESSURE: 124 MMHG | DIASTOLIC BLOOD PRESSURE: 85 MMHG | HEART RATE: 73 BPM | TEMPERATURE: 99 F

## 2019-10-07 DIAGNOSIS — Z79.01 LONG TERM (CURRENT) USE OF ANTICOAGULANTS: ICD-10-CM

## 2019-10-07 DIAGNOSIS — J30.2 PERENNIAL ALLERGIC RHINITIS WITH SEASONAL VARIATION: ICD-10-CM

## 2019-10-07 DIAGNOSIS — Z51.81 ENCOUNTER FOR THERAPEUTIC DRUG MONITORING: ICD-10-CM

## 2019-10-07 DIAGNOSIS — J30.89 ENVIRONMENTAL AND SEASONAL ALLERGIES: Primary | ICD-10-CM

## 2019-10-07 DIAGNOSIS — I63.019 CEREBROVASCULAR ACCIDENT (CVA) DUE TO THROMBOSIS OF VERTEBRAL ARTERY, UNSPECIFIED BLOOD VESSEL LATERALITY (HCC): ICD-10-CM

## 2019-10-07 DIAGNOSIS — G08 DURAL VENOUS SINUS THROMBOSIS: ICD-10-CM

## 2019-10-07 DIAGNOSIS — J30.89 PERENNIAL ALLERGIC RHINITIS WITH SEASONAL VARIATION: ICD-10-CM

## 2019-10-07 DIAGNOSIS — G08 THROMBOSIS TRANSVERSE SINUS: ICD-10-CM

## 2019-10-07 DIAGNOSIS — D68.59 HYPERCOAGULABLE STATE (HCC): ICD-10-CM

## 2019-10-07 PROCEDURE — 99214 OFFICE O/P EST MOD 30 MIN: CPT | Performed by: ALLERGY & IMMUNOLOGY

## 2019-10-07 PROCEDURE — 93793 ANTICOAG MGMT PT WARFARIN: CPT | Performed by: INTERNAL MEDICINE

## 2019-10-07 RX ORDER — FLUTICASONE PROPIONATE 50 MCG
SPRAY, SUSPENSION (ML) NASAL DAILY
COMMUNITY

## 2019-10-07 RX ORDER — MONTELUKAST SODIUM 10 MG/1
10 TABLET ORAL NIGHTLY
Qty: 90 TABLET | Refills: 1 | Status: SHIPPED | OUTPATIENT
Start: 2019-10-07 | End: 2020-03-18

## 2019-10-07 NOTE — PATIENT INSTRUCTIONS
Recs:   Continue with Singulair 10 mg once a day  Continue with Flonase 2 sprays per nostril once a day  Trial of Xyzal, levocetirizine 5 mg once night at bedtime as an antihistamine  Reviewed avoidance measures and potential treatment option of immunother

## 2019-10-07 NOTE — PROGRESS NOTES
Michelle Horne is a 29year old female. HPI:   Patient presents with: Allergies: 2 week follow up--patient reports she is feeling a little better and is thinking about possibly doing allergy shots.  Has not began taking Xyzal yet because insurance did no the LE, and then had after 3rd C section. Had on miscarriage not sure what trimester. Social History: Social History    Tobacco Use      Smoking status: Former Smoker      Smokeless tobacco: Former User      Tobacco comment: social     Alcohol use:  Nellie Lozano responsive, no acute distress noted  Head/Face: NC/Atraumatic  Eyes/Vision: conjunctiva and lids are normal extraocular motion is intact   Ears/Audiometry: tympanic membranes are normal bilaterally hearing is grossly intact  Nose/Mouth/Throat: nose and thr questions were answered in regards to medication administration and dosing and potential side effects.  Teaching was provided via the teach back method

## 2019-10-08 ENCOUNTER — TELEPHONE (OUTPATIENT)
Dept: ALLERGY | Facility: CLINIC | Age: 34
End: 2019-10-08

## 2019-10-11 ENCOUNTER — ANTI-COAG VISIT (OUTPATIENT)
Dept: INTERNAL MEDICINE CLINIC | Facility: CLINIC | Age: 34
End: 2019-10-11

## 2019-10-11 ENCOUNTER — APPOINTMENT (OUTPATIENT)
Dept: CT IMAGING | Facility: HOSPITAL | Age: 34
End: 2019-10-11
Attending: EMERGENCY MEDICINE
Payer: COMMERCIAL

## 2019-10-11 ENCOUNTER — HOSPITAL ENCOUNTER (EMERGENCY)
Facility: HOSPITAL | Age: 34
Discharge: HOME | End: 2019-10-11
Attending: EMERGENCY MEDICINE
Payer: COMMERCIAL

## 2019-10-11 ENCOUNTER — TELEPHONE (OUTPATIENT)
Dept: INTERNAL MEDICINE CLINIC | Facility: CLINIC | Age: 34
End: 2019-10-11

## 2019-10-11 ENCOUNTER — HOSPITAL ENCOUNTER (OUTPATIENT)
Age: 34
Discharge: ANOTHER HOSPITAL | End: 2019-10-11
Attending: EMERGENCY MEDICINE
Payer: COMMERCIAL

## 2019-10-11 VITALS
HEART RATE: 72 BPM | SYSTOLIC BLOOD PRESSURE: 120 MMHG | DIASTOLIC BLOOD PRESSURE: 78 MMHG | OXYGEN SATURATION: 99 % | TEMPERATURE: 98 F | BODY MASS INDEX: 28.69 KG/M2 | WEIGHT: 172.19 LBS | HEIGHT: 65 IN | RESPIRATION RATE: 19 BRPM

## 2019-10-11 VITALS
HEART RATE: 72 BPM | SYSTOLIC BLOOD PRESSURE: 127 MMHG | WEIGHT: 170 LBS | HEIGHT: 65 IN | TEMPERATURE: 99 F | OXYGEN SATURATION: 100 % | DIASTOLIC BLOOD PRESSURE: 89 MMHG | BODY MASS INDEX: 28.32 KG/M2 | RESPIRATION RATE: 16 BRPM

## 2019-10-11 DIAGNOSIS — K92.0 HEMATEMESIS, PRESENCE OF NAUSEA NOT SPECIFIED: ICD-10-CM

## 2019-10-11 DIAGNOSIS — Z92.29 HX: ANTICOAGULATION: ICD-10-CM

## 2019-10-11 DIAGNOSIS — G08 THROMBOSIS TRANSVERSE SINUS: ICD-10-CM

## 2019-10-11 DIAGNOSIS — I63.019 CEREBROVASCULAR ACCIDENT (CVA) DUE TO THROMBOSIS OF VERTEBRAL ARTERY, UNSPECIFIED BLOOD VESSEL LATERALITY (HCC): ICD-10-CM

## 2019-10-11 DIAGNOSIS — G43.001 MIGRAINE WITHOUT AURA AND WITH STATUS MIGRAINOSUS, NOT INTRACTABLE: Primary | ICD-10-CM

## 2019-10-11 DIAGNOSIS — Z51.81 ENCOUNTER FOR THERAPEUTIC DRUG MONITORING: ICD-10-CM

## 2019-10-11 DIAGNOSIS — R55 SYNCOPE AND COLLAPSE: Primary | ICD-10-CM

## 2019-10-11 DIAGNOSIS — G08 DURAL VENOUS SINUS THROMBOSIS: ICD-10-CM

## 2019-10-11 DIAGNOSIS — Z79.01 LONG TERM (CURRENT) USE OF ANTICOAGULANTS: ICD-10-CM

## 2019-10-11 DIAGNOSIS — D68.59 HYPERCOAGULABLE STATE (HCC): ICD-10-CM

## 2019-10-11 PROCEDURE — 93005 ELECTROCARDIOGRAM TRACING: CPT

## 2019-10-11 PROCEDURE — 96376 TX/PRO/DX INJ SAME DRUG ADON: CPT

## 2019-10-11 PROCEDURE — 96375 TX/PRO/DX INJ NEW DRUG ADDON: CPT

## 2019-10-11 PROCEDURE — 80048 BASIC METABOLIC PNL TOTAL CA: CPT | Performed by: EMERGENCY MEDICINE

## 2019-10-11 PROCEDURE — 96374 THER/PROPH/DIAG INJ IV PUSH: CPT

## 2019-10-11 PROCEDURE — 93010 ELECTROCARDIOGRAM REPORT: CPT

## 2019-10-11 PROCEDURE — 70450 CT HEAD/BRAIN W/O DYE: CPT | Performed by: EMERGENCY MEDICINE

## 2019-10-11 PROCEDURE — 85025 COMPLETE CBC W/AUTO DIFF WBC: CPT | Performed by: EMERGENCY MEDICINE

## 2019-10-11 PROCEDURE — 99285 EMERGENCY DEPT VISIT HI MDM: CPT

## 2019-10-11 PROCEDURE — C9113 INJ PANTOPRAZOLE SODIUM, VIA: HCPCS | Performed by: EMERGENCY MEDICINE

## 2019-10-11 PROCEDURE — 99214 OFFICE O/P EST MOD 30 MIN: CPT

## 2019-10-11 PROCEDURE — 85610 PROTHROMBIN TIME: CPT | Performed by: EMERGENCY MEDICINE

## 2019-10-11 PROCEDURE — 93010 ELECTROCARDIOGRAM REPORT: CPT | Performed by: EMERGENCY MEDICINE

## 2019-10-11 RX ORDER — ACETAMINOPHEN 500 MG
1000 TABLET ORAL ONCE
Status: COMPLETED | OUTPATIENT
Start: 2019-10-11 | End: 2019-10-11

## 2019-10-11 RX ORDER — METOCLOPRAMIDE HYDROCHLORIDE 5 MG/ML
5 INJECTION INTRAMUSCULAR; INTRAVENOUS ONCE
Status: COMPLETED | OUTPATIENT
Start: 2019-10-11 | End: 2019-10-11

## 2019-10-11 NOTE — ED INITIAL ASSESSMENT (HPI)
Sent from [address] [place] [address] for HA starting an hour ago + vomiting \"small speck of blood\" per family member. +near syncopal episode this morning + dizziness. Hx stroke in 2015. On coumadin.  Pt was off of coumadin for over a year and just started taking it again 3 weeks

## 2019-10-11 NOTE — ED PROVIDER NOTES
Patient Seen in: [facility] AND [facility] Emergency Department      History   Patient presents with:  Headache (neurologic)    Stated Complaint: HA    HPI    Patient is a 14-year-old female who presents to the emergency department with a chief complaint of head atraumatic. Eyes: Pupils are equal, round, and reactive to light. Conjunctivae and EOM are normal.   Neck: Normal range of motion. Neck supple. Cardiovascular: Normal rate, regular rhythm and normal heart sounds.    Pulmonary/Chest: Effort normal.   Abd diagnosis)    Disposition:  Discharge  10/11/2019  2:10 pm    Follow-up:  Daisha Hu MD  [address] [facility] [address] [phone]    In 3 days          Medications Prescribed:  Current Discharge Medication List

## 2019-10-11 NOTE — ED NOTES
Pt here with chronic intermittent migraines for years, friend states they have gotten worse since starting on coumadin 3 wks ago. Denies trauma to head. Pt states this am she got a headache, then dizzy and vomited.  States she saw blood in vomit all three t

## 2019-10-11 NOTE — TELEPHONE ENCOUNTER
Patient states she went to ER today 10/11 and ER advised patient of her INR level being at 1.8. Patient is requesting call back from nurse. Please advise.

## 2019-10-11 NOTE — ED INITIAL ASSESSMENT (HPI)
In washroom at work and had syncopal episode when she work up she vomited blood with clots in it. On arrival a/ox3 here with wife. Who will take her pvt car to hospital. declines 911. MAEW/ hx of 2 strokes and is on coumadin.

## 2019-10-11 NOTE — ED NOTES
Complains of increased headache and still is refusing to have 911 [place] for transport. Explained hit head and can be bleeding may have seizure on way to hospital or [facility]. Still declines 911. stts Will be fine.

## 2019-10-11 NOTE — ED PROVIDER NOTES
Patient Seen in: [facility] AND CLINICS Immediate Care In [address] [place] [address]      History   Patient presents with:  Syncope (cardiovascular, neurologic)    Stated Complaint: Dizziness/Vomitting blood    HPI  Patient is here with her wife who picked her up from work.   P Pulse 72   Resp 16   Temp 98.7 °F (37.1 °C)   Temp src Oral   SpO2 100 %   O2 Device None (Room air)       Current:/89   Pulse 72   Temp 98.7 °F (37.1 °C) (Oral)   Resp 16   Ht 165.1 cm (5' 5\")   Wt 77.1 kg   SpO2 100%   BMI 28.29 kg/m²         Ph Impression:  Syncope and collapse  (primary encounter diagnosis)  Hematemesis, presence of nausea not specified  HX: anticoagulation    Disposition: Ic to ed  10/11/2019  8:55 am    Follow-up:  No follow-up provider specified.       Medications Prescribed:

## 2019-10-14 ENCOUNTER — ANTI-COAG VISIT (OUTPATIENT)
Dept: INTERNAL MEDICINE CLINIC | Facility: CLINIC | Age: 34
End: 2019-10-14
Payer: COMMERCIAL

## 2019-10-14 DIAGNOSIS — Z51.81 ENCOUNTER FOR THERAPEUTIC DRUG MONITORING: ICD-10-CM

## 2019-10-14 DIAGNOSIS — Z79.01 LONG TERM (CURRENT) USE OF ANTICOAGULANTS: ICD-10-CM

## 2019-10-14 DIAGNOSIS — G08 DURAL VENOUS SINUS THROMBOSIS: ICD-10-CM

## 2019-10-14 DIAGNOSIS — G08 THROMBOSIS TRANSVERSE SINUS: ICD-10-CM

## 2019-10-14 DIAGNOSIS — I63.019 CEREBROVASCULAR ACCIDENT (CVA) DUE TO THROMBOSIS OF VERTEBRAL ARTERY, UNSPECIFIED BLOOD VESSEL LATERALITY (HCC): ICD-10-CM

## 2019-10-14 DIAGNOSIS — D68.59 HYPERCOAGULABLE STATE (HCC): ICD-10-CM

## 2019-10-14 PROCEDURE — 93793 ANTICOAG MGMT PT WARFARIN: CPT

## 2019-10-14 PROCEDURE — 36416 COLLJ CAPILLARY BLOOD SPEC: CPT

## 2019-10-14 PROCEDURE — 85610 PROTHROMBIN TIME: CPT

## 2019-10-18 ENCOUNTER — TELEPHONE (OUTPATIENT)
Dept: GASTROENTEROLOGY | Facility: CLINIC | Age: 34
End: 2019-10-18

## 2019-11-01 RX ORDER — WARFARIN SODIUM 5 MG/1
TABLET ORAL
Qty: 45 TABLET | Refills: 0 | OUTPATIENT
Start: 2019-11-01

## 2019-11-01 RX ORDER — WARFARIN SODIUM 5 MG/1
TABLET ORAL
Qty: 45 TABLET | Refills: 0 | Status: SHIPPED | OUTPATIENT
Start: 2019-11-01 | End: 2019-11-20

## 2019-11-01 RX ORDER — WARFARIN SODIUM 1 MG/1
TABLET ORAL
Qty: 30 TABLET | Refills: 0 | Status: SHIPPED | OUTPATIENT
Start: 2019-11-01 | End: 2020-01-07

## 2019-11-01 NOTE — TELEPHONE ENCOUNTER
Hi,    Please renew prescription for coumadin as I am not managing her anticoagulation.     Thanks,    [employer]

## 2019-11-22 ENCOUNTER — APPOINTMENT (OUTPATIENT)
Dept: LAB | Facility: HOSPITAL | Age: 34
End: 2019-11-22
Attending: INTERNAL MEDICINE
Payer: COMMERCIAL

## 2019-11-22 ENCOUNTER — TELEPHONE (OUTPATIENT)
Dept: INTERNAL MEDICINE CLINIC | Facility: CLINIC | Age: 34
End: 2019-11-22

## 2019-11-22 ENCOUNTER — OFFICE VISIT (OUTPATIENT)
Dept: INTERNAL MEDICINE CLINIC | Facility: CLINIC | Age: 34
End: 2019-11-22
Payer: COMMERCIAL

## 2019-11-22 ENCOUNTER — ANTI-COAG VISIT (OUTPATIENT)
Dept: INTERNAL MEDICINE CLINIC | Facility: CLINIC | Age: 34
End: 2019-11-22
Payer: COMMERCIAL

## 2019-11-22 VITALS
HEART RATE: 73 BPM | OXYGEN SATURATION: 99 % | SYSTOLIC BLOOD PRESSURE: 125 MMHG | HEIGHT: 65 IN | WEIGHT: 169 LBS | DIASTOLIC BLOOD PRESSURE: 80 MMHG | BODY MASS INDEX: 28.16 KG/M2 | RESPIRATION RATE: 17 BRPM | TEMPERATURE: 98 F

## 2019-11-22 DIAGNOSIS — Z51.81 ENCOUNTER FOR THERAPEUTIC DRUG MONITORING: ICD-10-CM

## 2019-11-22 DIAGNOSIS — G08 DURAL VENOUS SINUS THROMBOSIS: ICD-10-CM

## 2019-11-22 DIAGNOSIS — Z00.00 ANNUAL PHYSICAL EXAM: ICD-10-CM

## 2019-11-22 DIAGNOSIS — Z00.00 ANNUAL PHYSICAL EXAM: Primary | ICD-10-CM

## 2019-11-22 DIAGNOSIS — D68.59 HYPERCOAGULABLE STATE (HCC): ICD-10-CM

## 2019-11-22 DIAGNOSIS — Z79.01 LONG TERM (CURRENT) USE OF ANTICOAGULANTS: ICD-10-CM

## 2019-11-22 DIAGNOSIS — I63.00 CEREBROVASCULAR ACCIDENT (CVA) DUE TO THROMBOSIS OF PRECEREBRAL ARTERY (HCC): ICD-10-CM

## 2019-11-22 DIAGNOSIS — E78.5 HYPERLIPIDEMIA, UNSPECIFIED HYPERLIPIDEMIA TYPE: ICD-10-CM

## 2019-11-22 DIAGNOSIS — G08 THROMBOSIS TRANSVERSE SINUS: ICD-10-CM

## 2019-11-22 DIAGNOSIS — I63.019 CEREBROVASCULAR ACCIDENT (CVA) DUE TO THROMBOSIS OF VERTEBRAL ARTERY, UNSPECIFIED BLOOD VESSEL LATERALITY (HCC): ICD-10-CM

## 2019-11-22 PROCEDURE — 90471 IMMUNIZATION ADMIN: CPT | Performed by: INTERNAL MEDICINE

## 2019-11-22 PROCEDURE — 36416 COLLJ CAPILLARY BLOOD SPEC: CPT

## 2019-11-22 PROCEDURE — 80061 LIPID PANEL: CPT

## 2019-11-22 PROCEDURE — 99395 PREV VISIT EST AGE 18-39: CPT | Performed by: INTERNAL MEDICINE

## 2019-11-22 PROCEDURE — 36415 COLL VENOUS BLD VENIPUNCTURE: CPT

## 2019-11-22 PROCEDURE — 90686 IIV4 VACC NO PRSV 0.5 ML IM: CPT | Performed by: INTERNAL MEDICINE

## 2019-11-22 PROCEDURE — 85610 PROTHROMBIN TIME: CPT

## 2019-11-22 NOTE — PROGRESS NOTES
HPI:    Patient ID: Michelle Horne is a 29year old female.     HPI  Patient comes in for annual physical exam overall she is doing okay her and her partner are working with fertility clinic clinic she is going to get artificial insemination done in the very [place]     during fertility treatment   • History of blood clots    • Hypothyroid     during fertility treatment      Past Surgical History:   Procedure Laterality Date   • APPENDECTOMY  approx.  2006   • APPENDECTOMY     • LIPOMA REMOVAL        Family Histo above  Long term (current) use of anticoagulants continue with Coumadin will need to change to Lovenox once she gets pregnant  Hyperlipidemia, unspecified hyperlipidemia type patient not taking any medication will retest    Orders Placed This Encounter

## 2020-01-02 RX ORDER — WARFARIN SODIUM 5 MG/1
TABLET ORAL
Qty: 45 TABLET | Refills: 0 | Status: CANCELLED | OUTPATIENT
Start: 2020-01-02

## 2020-01-04 RX ORDER — WARFARIN SODIUM 5 MG/1
TABLET ORAL
Qty: 45 TABLET | Refills: 0 | Status: SHIPPED | OUTPATIENT
Start: 2020-01-04 | End: 2020-01-31

## 2020-01-04 NOTE — TELEPHONE ENCOUNTER
Coumadin Clinic, please reach out to pt. Last INR was done in 11/22.  Requesting Refill of Warfarin that PCP will handle but needs INR checked first.

## 2020-01-07 ENCOUNTER — TELEPHONE (OUTPATIENT)
Dept: INTERNAL MEDICINE CLINIC | Facility: CLINIC | Age: 35
End: 2020-01-07

## 2020-01-07 RX ORDER — WARFARIN SODIUM 5 MG/1
TABLET ORAL
Qty: 45 TABLET | Refills: 0 | OUTPATIENT
Start: 2020-01-07

## 2020-01-07 RX ORDER — WARFARIN SODIUM 1 MG/1
TABLET ORAL
Qty: 90 TABLET | Refills: 0 | Status: SHIPPED | OUTPATIENT
Start: 2020-01-07 | End: 2020-03-19

## 2020-01-07 NOTE — TELEPHONE ENCOUNTER
Dr Evla Panda on call for Dr Reyna Pack, please advise on script pended. No protocol. Last script 11/1/19 #30--patient takes 1 mg tablet daily, increased # to 90 with patient's agreement for more.     Patient called, she is still waiting on warfarin 5 mg and 1 mg re

## 2020-01-07 NOTE — TELEPHONE ENCOUNTER
Patient calling and states she is out of the medication and have been now for four days      Please advise

## 2020-01-07 NOTE — TELEPHONE ENCOUNTER
[facility] coumadin clinic does not refill warfarin. S/w patient today and scheduled INR. Patient had been without warfarin for 4 days.  Advised refill was sent to [facility] by PCP 1/4    Disp Refills Start End    WARFARIN SODIUM 5 MG Oral Tab 45 tablet 0 1/4/20

## 2020-01-07 NOTE — TELEPHONE ENCOUNTER
Patient is requesting a refill for     Warfarin Sodium 1 MG Oral Tab   11/01/19  -- Sabrina Vega MD    TAKE 1 TABLET BY MOUTH NIGHTLY.  SEE INSTRUCTIONS ON INR TABLE    WARFARIN SODIUM 5 MG Oral Tab   01/04/20  -- Sabrina Vega MD    TAKE 1 AND 1/2 T

## 2020-01-08 ENCOUNTER — ANTI-COAG VISIT (OUTPATIENT)
Dept: INTERNAL MEDICINE CLINIC | Facility: CLINIC | Age: 35
End: 2020-01-08
Payer: COMMERCIAL

## 2020-01-08 DIAGNOSIS — G08 THROMBOSIS TRANSVERSE SINUS: ICD-10-CM

## 2020-01-08 DIAGNOSIS — I63.019 CEREBROVASCULAR ACCIDENT (CVA) DUE TO THROMBOSIS OF VERTEBRAL ARTERY, UNSPECIFIED BLOOD VESSEL LATERALITY (HCC): ICD-10-CM

## 2020-01-08 DIAGNOSIS — Z79.01 LONG TERM (CURRENT) USE OF ANTICOAGULANTS: ICD-10-CM

## 2020-01-08 DIAGNOSIS — D68.59 HYPERCOAGULABLE STATE (HCC): ICD-10-CM

## 2020-01-08 DIAGNOSIS — Z51.81 ENCOUNTER FOR THERAPEUTIC DRUG MONITORING: ICD-10-CM

## 2020-01-08 DIAGNOSIS — G08 DURAL VENOUS SINUS THROMBOSIS: ICD-10-CM

## 2020-01-08 LAB — INR: 1.1 (ref 0.8–1.2)

## 2020-01-08 PROCEDURE — 85610 PROTHROMBIN TIME: CPT

## 2020-01-08 PROCEDURE — 36416 COLLJ CAPILLARY BLOOD SPEC: CPT

## 2020-01-08 PROCEDURE — 93793 ANTICOAG MGMT PT WARFARIN: CPT

## 2020-01-08 RX ORDER — WARFARIN SODIUM 5 MG/1
TABLET ORAL
Qty: 45 TABLET | Refills: 0 | OUTPATIENT
Start: 2020-01-08

## 2020-01-15 ENCOUNTER — ANTI-COAG VISIT (OUTPATIENT)
Dept: INTERNAL MEDICINE CLINIC | Facility: CLINIC | Age: 35
End: 2020-01-15
Payer: COMMERCIAL

## 2020-01-15 DIAGNOSIS — G08 DURAL VENOUS SINUS THROMBOSIS: ICD-10-CM

## 2020-01-15 DIAGNOSIS — Z79.01 LONG TERM (CURRENT) USE OF ANTICOAGULANTS: ICD-10-CM

## 2020-01-15 DIAGNOSIS — Z51.81 ENCOUNTER FOR THERAPEUTIC DRUG MONITORING: ICD-10-CM

## 2020-01-15 DIAGNOSIS — G08 THROMBOSIS TRANSVERSE SINUS: ICD-10-CM

## 2020-01-15 DIAGNOSIS — I63.019 CEREBROVASCULAR ACCIDENT (CVA) DUE TO THROMBOSIS OF VERTEBRAL ARTERY, UNSPECIFIED BLOOD VESSEL LATERALITY (HCC): ICD-10-CM

## 2020-01-15 DIAGNOSIS — D68.59 HYPERCOAGULABLE STATE (HCC): ICD-10-CM

## 2020-01-15 LAB
INR: 2.4 (ref 2–3)
TEST STRIP EXPIRATION DATE: NORMAL DATE

## 2020-01-15 PROCEDURE — 85610 PROTHROMBIN TIME: CPT

## 2020-01-15 PROCEDURE — 93793 ANTICOAG MGMT PT WARFARIN: CPT

## 2020-01-15 PROCEDURE — 36416 COLLJ CAPILLARY BLOOD SPEC: CPT

## 2020-01-30 RX ORDER — WARFARIN SODIUM 1 MG/1
TABLET ORAL
Qty: 30 TABLET | Refills: 0 | OUTPATIENT
Start: 2020-01-30

## 2020-01-31 RX ORDER — WARFARIN SODIUM 5 MG/1
TABLET ORAL
Qty: 45 TABLET | Refills: 0 | Status: SHIPPED | OUTPATIENT
Start: 2020-01-31 | End: 2020-03-26

## 2020-01-31 NOTE — TELEPHONE ENCOUNTER
Duplicate request, previously addressed. Requested Prescriptions     Pending Prescriptions Disp Refills   • WARFARIN SODIUM 1 MG Oral Tab [Pharmacy Med Name: WARFARIN SOD 1MG TABLETS (PINK)] 30 tablet 0     Sig: TAKE 1 TABLET BY MOUTH AT NIGHT.  SEE INST

## 2020-02-01 ENCOUNTER — TELEPHONE (OUTPATIENT)
Dept: INTERNAL MEDICINE CLINIC | Facility: CLINIC | Age: 35
End: 2020-02-01

## 2020-02-01 NOTE — TELEPHONE ENCOUNTER
Patient is requesting a refill on Warfarin Sodium 1 MG Oral Tab to be sent yo the [facility] in [address] [place] [address]. Patient states she received the 5MG, but needs the 1MG to equal to a total of [facility]

## 2020-02-12 ENCOUNTER — ANTI-COAG VISIT (OUTPATIENT)
Dept: INTERNAL MEDICINE CLINIC | Facility: CLINIC | Age: 35
End: 2020-02-12
Payer: COMMERCIAL

## 2020-02-12 DIAGNOSIS — Z79.01 LONG TERM (CURRENT) USE OF ANTICOAGULANTS: ICD-10-CM

## 2020-02-12 DIAGNOSIS — I63.019 CEREBROVASCULAR ACCIDENT (CVA) DUE TO THROMBOSIS OF VERTEBRAL ARTERY, UNSPECIFIED BLOOD VESSEL LATERALITY (HCC): ICD-10-CM

## 2020-02-12 DIAGNOSIS — Z51.81 ENCOUNTER FOR THERAPEUTIC DRUG MONITORING: ICD-10-CM

## 2020-02-12 DIAGNOSIS — D68.59 HYPERCOAGULABLE STATE (HCC): ICD-10-CM

## 2020-02-12 DIAGNOSIS — G08 THROMBOSIS TRANSVERSE SINUS: ICD-10-CM

## 2020-02-12 DIAGNOSIS — G08 DURAL VENOUS SINUS THROMBOSIS: ICD-10-CM

## 2020-02-12 LAB
INR: 2.2 (ref 0.8–1.2)
TEST STRIP EXPIRATION DATE: ABNORMAL DATE

## 2020-02-12 PROCEDURE — 93793 ANTICOAG MGMT PT WARFARIN: CPT

## 2020-02-12 PROCEDURE — 85610 PROTHROMBIN TIME: CPT

## 2020-02-12 PROCEDURE — 36416 COLLJ CAPILLARY BLOOD SPEC: CPT

## 2020-02-18 ENCOUNTER — OFFICE VISIT (OUTPATIENT)
Dept: INTERNAL MEDICINE CLINIC | Facility: CLINIC | Age: 35
End: 2020-02-18
Payer: COMMERCIAL

## 2020-02-18 VITALS
DIASTOLIC BLOOD PRESSURE: 82 MMHG | HEART RATE: 65 BPM | WEIGHT: 176 LBS | BODY MASS INDEX: 29 KG/M2 | OXYGEN SATURATION: 99 % | SYSTOLIC BLOOD PRESSURE: 117 MMHG | TEMPERATURE: 98 F

## 2020-02-18 DIAGNOSIS — E78.5 HYPERLIPIDEMIA, UNSPECIFIED HYPERLIPIDEMIA TYPE: Primary | ICD-10-CM

## 2020-02-18 DIAGNOSIS — D68.59 HYPERCOAGULABLE STATE (HCC): ICD-10-CM

## 2020-02-18 DIAGNOSIS — G08 THROMBOSIS TRANSVERSE SINUS: ICD-10-CM

## 2020-02-18 PROCEDURE — 99213 OFFICE O/P EST LOW 20 MIN: CPT | Performed by: INTERNAL MEDICINE

## 2020-02-18 NOTE — PROGRESS NOTES
HPI:    Patient ID: Raul Chirinos is a 29year old female.     HPI  For follow-up overall she is doing okay watching her diet she is trying to get pregnant with her girlfriend the following with a specialist.  Her headaches are stable she is not taking Topa Family History   Problem Relation Age of Onset   • Diabetes Father    • Other (ischemic bowel) Father    • Stroke Mother         Unsure of age at time of stroke.     • Other (Htn) Paternal Grandmother    • Other (DVT) Paternal [de-identified]         unsure of age,

## 2020-02-25 ENCOUNTER — TELEPHONE (OUTPATIENT)
Dept: INTERNAL MEDICINE CLINIC | Facility: CLINIC | Age: 35
End: 2020-02-25

## 2020-02-25 NOTE — TELEPHONE ENCOUNTER
Patient is having dental work done next week and will be of her coumadin for 3 days. Patient wants to inform Dr.     Also patient stats that her Dental Dr will like a fax stat he is aware and was inform about patient.          Fax [phone]    ATT: Dr Carlos Orozco

## 2020-02-26 NOTE — TELEPHONE ENCOUNTER
I would not suggest to hold the Coumadin because even thou the chance of getting a clot is low its still a possibility  but if she decides to hold it she has to understand she is taking the risk.

## 2020-02-27 ENCOUNTER — TELEPHONE (OUTPATIENT)
Dept: INTERNAL MEDICINE CLINIC | Facility: CLINIC | Age: 35
End: 2020-02-27

## 2020-02-27 ENCOUNTER — TELEPHONE (OUTPATIENT)
Dept: HEMATOLOGY/ONCOLOGY | Facility: HOSPITAL | Age: 35
End: 2020-02-27

## 2020-02-27 RX ORDER — ENOXAPARIN SODIUM 100 MG/ML
1 INJECTION SUBCUTANEOUS 2 TIMES DAILY
Qty: 15 SYRINGE | Refills: 0 | Status: SHIPPED | OUTPATIENT
Start: 2020-03-02 | End: 2020-09-29

## 2020-02-27 NOTE — TELEPHONE ENCOUNTER
Patient has a tooth extraction on 3/5/20 and the Dentist would like her to stop taking her Coumadin 3 days prior to 3/5/20 and she need Dr. Lnius Melton to give her a note or fax a letter to Dr. Simmons Kil the Dentist, please fax to [phone].

## 2020-02-27 NOTE — TELEPHONE ENCOUNTER
(Please see Medication questions from 2/25 and Dr. Willy Pickard from 2/27)    Patient called in stating that she was told by Dr. Darcie Corrales to start Lovenox injections, but patient is unsure if that is only during the time off coumadin or what the directions would be.

## 2020-02-27 NOTE — TELEPHONE ENCOUNTER
So do not take  coumadin on 3/2/20, start Lovenox on 3/2/20 bid , hold Lovenox pm dose on  3/4/20. After extraction on 3/5/20, start coumdain 3/6/20 and also restart with Lovenox bid, have the INR checked on 3/6/20. Then we can rechekc INR on 3/9/20.   Any

## 2020-02-27 NOTE — TELEPHONE ENCOUNTER
Advised patient of note below. She stated that the dentist advised her to stop coumadin 3 days before the extraction of a broken tooth. I did advise the patient to speak to  regarding this as well. She will call her tomorrow for further advice.

## 2020-02-27 NOTE — TELEPHONE ENCOUNTER
Returned phone call per Dr. Laura Girard request and notified that she forwarded her recommendation to Dr. Alessandra Orta. Her recommendation was to hold warfarin 3 days before oral procedure and to bridge with Lovenox q 12 hours.  Pt should hold Lovenox the night before p

## 2020-02-27 NOTE — TELEPHONE ENCOUNTER
Dr Rennie Olszewski below and advise regarding coumadin, Lovenox and INR, patient requesting direction. , for tooth extraction on 3/5/20.    2/12/20 INR =2.2      Please reply to pool: ANGEL Wynn, RN       2/27/20 2:28 PM   Note      Return

## 2020-02-28 NOTE — TELEPHONE ENCOUNTER
Spoke with the patient and instructed her on Dr. Mak Kelley orders from below. Patient voiced understanding and agreed with the plan of care and confirmed she will make an appointment with the coumadin clinic tomorrow to have her INR checked on 3/6/20.

## 2020-02-28 NOTE — TELEPHONE ENCOUNTER
Dr. Rachel Kenny please confirm:    1. On 3/2/20 no coumadin patient will start Lovenox BID    2. Patient will hold the dose of the Lovenox pm dose on 3/4/20    3. Patient will not have any coumadin or Lovenox on 3/5/20    4.  Patient will restart Lovenox BID and

## 2020-03-02 ENCOUNTER — TELEPHONE (OUTPATIENT)
Dept: INTERNAL MEDICINE CLINIC | Facility: CLINIC | Age: 35
End: 2020-03-02

## 2020-03-02 NOTE — TELEPHONE ENCOUNTER
Patient requesting an appointment for 3/6 and 3/9 due to having her tooth extracted and she needed to come in earlier and she states she may have to cancel her appointment on 3/11.

## 2020-03-03 NOTE — TELEPHONE ENCOUNTER
Patient on the phone states she was not give directions for today 03/03. She will like to know if medication should be injected today.  Please advice

## 2020-03-03 NOTE — TELEPHONE ENCOUNTER
Advised patient of Dr. Andi Medeiros note. Directions given for administering Lovenox and Coumadin. Patient verbalized understanding.

## 2020-03-03 NOTE — TELEPHONE ENCOUNTER
Call transferred by CSS: Pt states there were no instructions given for Lovenox today; wanting to verify should inject both morning and evening dose today?  Please advise    Please reply to kell: ANGEL Latham

## 2020-03-05 ENCOUNTER — TELEPHONE (OUTPATIENT)
Dept: INTERNAL MEDICINE CLINIC | Facility: CLINIC | Age: 35
End: 2020-03-05

## 2020-03-05 NOTE — TELEPHONE ENCOUNTER
Patient is at her dentist Dr. Rosalba Fair office and they are requesting a note that states patient can have her tooth extracted.   Please fax to [phone]

## 2020-03-06 ENCOUNTER — ANTI-COAG VISIT (OUTPATIENT)
Dept: INTERNAL MEDICINE CLINIC | Facility: CLINIC | Age: 35
End: 2020-03-06
Payer: COMMERCIAL

## 2020-03-06 DIAGNOSIS — G08 THROMBOSIS TRANSVERSE SINUS: ICD-10-CM

## 2020-03-06 DIAGNOSIS — Z51.81 ENCOUNTER FOR THERAPEUTIC DRUG MONITORING: ICD-10-CM

## 2020-03-06 DIAGNOSIS — I63.019 CEREBROVASCULAR ACCIDENT (CVA) DUE TO THROMBOSIS OF VERTEBRAL ARTERY, UNSPECIFIED BLOOD VESSEL LATERALITY (HCC): ICD-10-CM

## 2020-03-06 DIAGNOSIS — D68.59 HYPERCOAGULABLE STATE (HCC): ICD-10-CM

## 2020-03-06 DIAGNOSIS — G08 DURAL VENOUS SINUS THROMBOSIS: ICD-10-CM

## 2020-03-06 DIAGNOSIS — Z79.01 LONG TERM (CURRENT) USE OF ANTICOAGULANTS: ICD-10-CM

## 2020-03-06 LAB
INR: 1 (ref 0.8–1.2)
TEST STRIP EXPIRATION DATE: NORMAL DATE

## 2020-03-06 PROCEDURE — 36416 COLLJ CAPILLARY BLOOD SPEC: CPT

## 2020-03-06 PROCEDURE — 85610 PROTHROMBIN TIME: CPT

## 2020-03-06 PROCEDURE — 93793 ANTICOAG MGMT PT WARFARIN: CPT

## 2020-03-10 ENCOUNTER — ANTI-COAG VISIT (OUTPATIENT)
Dept: INTERNAL MEDICINE CLINIC | Facility: CLINIC | Age: 35
End: 2020-03-10
Payer: COMMERCIAL

## 2020-03-10 DIAGNOSIS — D68.59 HYPERCOAGULABLE STATE (HCC): ICD-10-CM

## 2020-03-10 DIAGNOSIS — Z51.81 ENCOUNTER FOR THERAPEUTIC DRUG MONITORING: ICD-10-CM

## 2020-03-10 DIAGNOSIS — I63.019 CEREBROVASCULAR ACCIDENT (CVA) DUE TO THROMBOSIS OF VERTEBRAL ARTERY, UNSPECIFIED BLOOD VESSEL LATERALITY (HCC): ICD-10-CM

## 2020-03-10 DIAGNOSIS — G08 DURAL VENOUS SINUS THROMBOSIS: ICD-10-CM

## 2020-03-10 DIAGNOSIS — G08 THROMBOSIS TRANSVERSE SINUS: ICD-10-CM

## 2020-03-10 DIAGNOSIS — Z79.01 LONG TERM (CURRENT) USE OF ANTICOAGULANTS: ICD-10-CM

## 2020-03-10 LAB
INR: 2.2 (ref 0.8–1.2)
TEST STRIP EXPIRATION DATE: ABNORMAL DATE

## 2020-03-10 PROCEDURE — 85610 PROTHROMBIN TIME: CPT

## 2020-03-10 PROCEDURE — 93793 ANTICOAG MGMT PT WARFARIN: CPT

## 2020-03-10 PROCEDURE — 36416 COLLJ CAPILLARY BLOOD SPEC: CPT

## 2020-03-18 DIAGNOSIS — J30.2 PERENNIAL ALLERGIC RHINITIS WITH SEASONAL VARIATION: ICD-10-CM

## 2020-03-18 DIAGNOSIS — J30.89 ENVIRONMENTAL AND SEASONAL ALLERGIES: ICD-10-CM

## 2020-03-18 DIAGNOSIS — J30.89 PERENNIAL ALLERGIC RHINITIS WITH SEASONAL VARIATION: ICD-10-CM

## 2020-03-18 RX ORDER — MONTELUKAST SODIUM 10 MG/1
TABLET ORAL
Qty: 90 TABLET | Refills: 0 | Status: SHIPPED | OUTPATIENT
Start: 2020-03-18 | End: 2020-05-06

## 2020-03-18 NOTE — TELEPHONE ENCOUNTER
Refill requested for:   Name from pharmacy: MONTELUKAST 10MG TABLETS         Will file in chart as: MONTELUKAST SODIUM 10 MG Oral Tab         Sig: TAKE 1 TABLET(10 MG) BY MOUTH EVERY NIGHT    Disp:  90 tablet    Refills:  1 (Pharmacy requested: Not specifi

## 2020-03-19 RX ORDER — WARFARIN SODIUM 1 MG/1
TABLET ORAL
Qty: 90 TABLET | Refills: 0 | Status: SHIPPED | OUTPATIENT
Start: 2020-03-19 | End: 2020-07-21

## 2020-03-19 NOTE — TELEPHONE ENCOUNTER
Spoke with patient. Verified name and . informed patient per Dr. Doron Ascencio has been refill and will need an appointment fir further refills. Patient verbalizes understanding and will call back to schedule an appointment.

## 2020-03-26 RX ORDER — WARFARIN SODIUM 5 MG/1
TABLET ORAL
Qty: 45 TABLET | Refills: 0 | Status: SHIPPED | OUTPATIENT
Start: 2020-03-26 | End: 2020-05-20

## 2020-04-07 ENCOUNTER — TELEPHONE (OUTPATIENT)
Dept: INTERNAL MEDICINE CLINIC | Facility: CLINIC | Age: 35
End: 2020-04-07

## 2020-04-07 ENCOUNTER — ANTI-COAG VISIT (OUTPATIENT)
Dept: INTERNAL MEDICINE CLINIC | Facility: CLINIC | Age: 35
End: 2020-04-07
Payer: COMMERCIAL

## 2020-04-07 DIAGNOSIS — Z79.01 LONG TERM (CURRENT) USE OF ANTICOAGULANTS: ICD-10-CM

## 2020-04-07 DIAGNOSIS — D68.59 HYPERCOAGULABLE STATE (HCC): ICD-10-CM

## 2020-04-07 DIAGNOSIS — I63.019 CEREBROVASCULAR ACCIDENT (CVA) DUE TO THROMBOSIS OF VERTEBRAL ARTERY, UNSPECIFIED BLOOD VESSEL LATERALITY (HCC): ICD-10-CM

## 2020-04-07 DIAGNOSIS — G08 THROMBOSIS TRANSVERSE SINUS: ICD-10-CM

## 2020-04-07 DIAGNOSIS — G08 DURAL VENOUS SINUS THROMBOSIS: ICD-10-CM

## 2020-04-07 DIAGNOSIS — Z51.81 ENCOUNTER FOR THERAPEUTIC DRUG MONITORING: ICD-10-CM

## 2020-04-07 PROCEDURE — 36416 COLLJ CAPILLARY BLOOD SPEC: CPT

## 2020-04-07 PROCEDURE — 93793 ANTICOAG MGMT PT WARFARIN: CPT

## 2020-04-07 PROCEDURE — 85610 PROTHROMBIN TIME: CPT

## 2020-04-07 NOTE — TELEPHONE ENCOUNTER
Today's INR 3.6  Goal 2.0-3.0    Per protocol, hold 2 doses, weekly dose decrease by 10%-repeat INR in 1-2 weeks. Actual- hold 2 doses, weekly dose decreased by 7.1%, repeat INR in 2 weeks.

## 2020-04-21 ENCOUNTER — ANTI-COAG VISIT (OUTPATIENT)
Dept: INTERNAL MEDICINE CLINIC | Facility: CLINIC | Age: 35
End: 2020-04-21
Payer: COMMERCIAL

## 2020-04-21 ENCOUNTER — TELEPHONE (OUTPATIENT)
Dept: INTERNAL MEDICINE CLINIC | Facility: CLINIC | Age: 35
End: 2020-04-21

## 2020-04-21 DIAGNOSIS — Z51.81 ENCOUNTER FOR THERAPEUTIC DRUG MONITORING: ICD-10-CM

## 2020-04-21 DIAGNOSIS — Z79.01 LONG TERM (CURRENT) USE OF ANTICOAGULANTS: ICD-10-CM

## 2020-04-21 DIAGNOSIS — G08 DURAL VENOUS SINUS THROMBOSIS: ICD-10-CM

## 2020-04-21 DIAGNOSIS — D68.59 HYPERCOAGULABLE STATE (HCC): ICD-10-CM

## 2020-04-21 DIAGNOSIS — I63.019 CEREBROVASCULAR ACCIDENT (CVA) DUE TO THROMBOSIS OF VERTEBRAL ARTERY, UNSPECIFIED BLOOD VESSEL LATERALITY (HCC): ICD-10-CM

## 2020-04-21 DIAGNOSIS — G08 THROMBOSIS TRANSVERSE SINUS: ICD-10-CM

## 2020-04-21 PROCEDURE — 93793 ANTICOAG MGMT PT WARFARIN: CPT

## 2020-04-21 PROCEDURE — 36416 COLLJ CAPILLARY BLOOD SPEC: CPT

## 2020-04-21 PROCEDURE — 85610 PROTHROMBIN TIME: CPT

## 2020-04-21 NOTE — TELEPHONE ENCOUNTER
Today's INR 3.8  (INR 4/7= 3.6)  Goal 2.0-3.0    Educated on limiting alcohol as it may thin the blood and to increase greens/veggies in diet.      Per protocol to hold 2 doses, weekly dose decreased by 10%- actual decrease 10.3% and repeat INR in 1-2 weeks

## 2020-04-21 NOTE — TELEPHONE ENCOUNTER
Noted. 

## 2020-04-30 DIAGNOSIS — J30.2 PERENNIAL ALLERGIC RHINITIS WITH SEASONAL VARIATION: ICD-10-CM

## 2020-04-30 DIAGNOSIS — J30.89 ENVIRONMENTAL AND SEASONAL ALLERGIES: ICD-10-CM

## 2020-04-30 DIAGNOSIS — J30.89 PERENNIAL ALLERGIC RHINITIS WITH SEASONAL VARIATION: ICD-10-CM

## 2020-04-30 RX ORDER — MONTELUKAST SODIUM 10 MG/1
10 TABLET ORAL NIGHTLY
Qty: 90 TABLET | Refills: 0 | OUTPATIENT
Start: 2020-04-30

## 2020-04-30 NOTE — TELEPHONE ENCOUNTER
Refill requested for    Montelukast Sodium 10 MG Oral Tab              Sig: Take 1 tablet (10 mg total) by mouth nightly.     Disp:  90 tablet    Refills:  0    Start: 4/30/2020    Class: Normal    Non-formulary For: Environmental and seasonal allergies, Pe

## 2020-04-30 NOTE — TELEPHONE ENCOUNTER
Refill for Singulair for 90 days was sent on March 18, 2020. Refill request denied at this time. Patient also due for follow-up. Please schedule a follow-up appointment for May 2020.

## 2020-04-30 NOTE — TELEPHONE ENCOUNTER
Called and spoke with patient, reports she took her last Singulair tab last PM and has been strictly taking 1 daily. She has been having \"issues\" with [address] [facility].   Patient reports her bottle picked up 1/29/20 was for #90, then with the 3/18/

## 2020-05-04 ENCOUNTER — TELEPHONE (OUTPATIENT)
Dept: FAMILY MEDICINE CLINIC | Facility: CLINIC | Age: 35
End: 2020-05-04

## 2020-05-04 ENCOUNTER — TELEPHONE (OUTPATIENT)
Dept: ALLERGY | Facility: CLINIC | Age: 35
End: 2020-05-04

## 2020-05-04 ENCOUNTER — TELEPHONE (OUTPATIENT)
Dept: CARDIOLOGY CLINIC | Facility: CLINIC | Age: 35
End: 2020-05-04

## 2020-05-04 NOTE — TELEPHONE ENCOUNTER
Left a message for patient that she is scheduled for a video visit this coming Wed. 5/6 with Dr. Haley Govea and not an in office visit, to please contact our office with any further questions.

## 2020-05-04 NOTE — TELEPHONE ENCOUNTER
Patient has tested positive for cov 2 weeks ago please advise  if patient can come to her anticoag appt tomorrow or not

## 2020-05-04 NOTE — TELEPHONE ENCOUNTER
PT stts she has been in contact with someone who tested positive for Covid-19 over 2 weeks ago around April 10th. Pt stts she does not have any symptoms and wanted to know should she still come to her appt.  Please advise

## 2020-05-04 NOTE — TELEPHONE ENCOUNTER
Sw/ pt and confirms with me that she was not positive for Covid 19. Her grandparents were positive for Covid. Her Grandparents were hospitalized and the last time she saw them was April 10.  She denies any fever, chills, sore throat, cough, shortness of tiki

## 2020-05-05 ENCOUNTER — ANTI-COAG VISIT (OUTPATIENT)
Dept: INTERNAL MEDICINE CLINIC | Facility: CLINIC | Age: 35
End: 2020-05-05
Payer: COMMERCIAL

## 2020-05-05 DIAGNOSIS — D68.59 HYPERCOAGULABLE STATE (HCC): ICD-10-CM

## 2020-05-05 DIAGNOSIS — Z51.81 ENCOUNTER FOR THERAPEUTIC DRUG MONITORING: ICD-10-CM

## 2020-05-05 DIAGNOSIS — G08 THROMBOSIS TRANSVERSE SINUS: ICD-10-CM

## 2020-05-05 DIAGNOSIS — G08 DURAL VENOUS SINUS THROMBOSIS: ICD-10-CM

## 2020-05-05 DIAGNOSIS — Z79.01 LONG TERM (CURRENT) USE OF ANTICOAGULANTS: ICD-10-CM

## 2020-05-05 DIAGNOSIS — I63.019 CEREBROVASCULAR ACCIDENT (CVA) DUE TO THROMBOSIS OF VERTEBRAL ARTERY, UNSPECIFIED BLOOD VESSEL LATERALITY (HCC): ICD-10-CM

## 2020-05-05 PROCEDURE — 93793 ANTICOAG MGMT PT WARFARIN: CPT

## 2020-05-05 PROCEDURE — 36416 COLLJ CAPILLARY BLOOD SPEC: CPT

## 2020-05-05 PROCEDURE — 85610 PROTHROMBIN TIME: CPT

## 2020-05-06 ENCOUNTER — TELEMEDICINE (OUTPATIENT)
Dept: ALLERGY | Facility: CLINIC | Age: 35
End: 2020-05-06
Payer: COMMERCIAL

## 2020-05-06 DIAGNOSIS — J30.2 PERENNIAL ALLERGIC RHINITIS WITH SEASONAL VARIATION: Primary | ICD-10-CM

## 2020-05-06 DIAGNOSIS — J30.89 ENVIRONMENTAL AND SEASONAL ALLERGIES: ICD-10-CM

## 2020-05-06 DIAGNOSIS — J30.89 PERENNIAL ALLERGIC RHINITIS WITH SEASONAL VARIATION: Primary | ICD-10-CM

## 2020-05-06 PROCEDURE — 99214 OFFICE O/P EST MOD 30 MIN: CPT | Performed by: ALLERGY & IMMUNOLOGY

## 2020-05-06 RX ORDER — MONTELUKAST SODIUM 10 MG/1
10 TABLET ORAL NIGHTLY
Qty: 90 TABLET | Refills: 0 | Status: SHIPPED | OUTPATIENT
Start: 2020-05-06 | End: 2020-10-19

## 2020-05-06 NOTE — PROGRESS NOTES
Tevinjim Banegas is a 29year old female. HPI:   No chief complaint on file.     Patient is a 26-year-old female who presents for follow-up via tele-med/video visit due to current coronavirus pandemic    Patient last seen by me in October 2019    Patient ha status: Former Smoker      Smokeless tobacco: Former User      Tobacco comment: social     Alcohol use: Yes      Alcohol/week: 0.0 standard drinks      Comment: 1-2 drinks every other month on average.     Drug use: Not Currently       Medications (Active p intact   Ears/Audiometry: tympanic membranes are normal bilaterally hearing is grossly intact  Nose/Mouth/Throat: nose and throat are clear mucous membranes are moist   Neck/Thyroid: neck is supple without adenopathy  Lymphatic: no abnormal cervical, supra education and training related to self administration of these medications. Teaching, instruction and sample was provided to the patient by myself. Teaching included  a review of potential adverse side effects as well as potential efficacy.   Patient's qu

## 2020-05-06 NOTE — PATIENT INSTRUCTIONS
Recs:  Continue with Singulair, montelukast 10 mg once a day  Xyzal, levocetirizine 5 mg once a day up to twice a day if needed  Flonase 2 sprays per nostril once a day  Add trial of Pazeo eyedrops 1 drop per eye once a day.   Please store in [address]  for

## 2020-05-19 ENCOUNTER — ANTI-COAG VISIT (OUTPATIENT)
Dept: INTERNAL MEDICINE CLINIC | Facility: CLINIC | Age: 35
End: 2020-05-19
Payer: COMMERCIAL

## 2020-05-19 DIAGNOSIS — I63.019 CEREBROVASCULAR ACCIDENT (CVA) DUE TO THROMBOSIS OF VERTEBRAL ARTERY, UNSPECIFIED BLOOD VESSEL LATERALITY (HCC): ICD-10-CM

## 2020-05-19 DIAGNOSIS — D68.59 HYPERCOAGULABLE STATE (HCC): ICD-10-CM

## 2020-05-19 DIAGNOSIS — Z79.01 LONG TERM (CURRENT) USE OF ANTICOAGULANTS: ICD-10-CM

## 2020-05-19 DIAGNOSIS — G08 DURAL VENOUS SINUS THROMBOSIS: ICD-10-CM

## 2020-05-19 DIAGNOSIS — G08 THROMBOSIS TRANSVERSE SINUS: ICD-10-CM

## 2020-05-19 DIAGNOSIS — Z51.81 ENCOUNTER FOR THERAPEUTIC DRUG MONITORING: ICD-10-CM

## 2020-05-19 PROCEDURE — 93793 ANTICOAG MGMT PT WARFARIN: CPT

## 2020-05-19 PROCEDURE — 85610 PROTHROMBIN TIME: CPT

## 2020-05-19 PROCEDURE — 36416 COLLJ CAPILLARY BLOOD SPEC: CPT

## 2020-05-20 RX ORDER — WARFARIN SODIUM 5 MG/1
TABLET ORAL
Qty: 45 TABLET | Refills: 0 | Status: SHIPPED | OUTPATIENT
Start: 2020-05-20 | End: 2020-07-21

## 2020-06-16 ENCOUNTER — TELEPHONE (OUTPATIENT)
Dept: INTERNAL MEDICINE CLINIC | Facility: CLINIC | Age: 35
End: 2020-06-16

## 2020-06-16 ENCOUNTER — ANTI-COAG VISIT (OUTPATIENT)
Dept: INTERNAL MEDICINE CLINIC | Facility: CLINIC | Age: 35
End: 2020-06-16
Payer: COMMERCIAL

## 2020-06-16 DIAGNOSIS — G08 THROMBOSIS TRANSVERSE SINUS: ICD-10-CM

## 2020-06-16 DIAGNOSIS — Z79.01 LONG TERM (CURRENT) USE OF ANTICOAGULANTS: ICD-10-CM

## 2020-06-16 DIAGNOSIS — Z51.81 ENCOUNTER FOR THERAPEUTIC DRUG MONITORING: ICD-10-CM

## 2020-06-16 DIAGNOSIS — I63.019 CEREBROVASCULAR ACCIDENT (CVA) DUE TO THROMBOSIS OF VERTEBRAL ARTERY, UNSPECIFIED BLOOD VESSEL LATERALITY (HCC): ICD-10-CM

## 2020-06-16 DIAGNOSIS — G08 DURAL VENOUS SINUS THROMBOSIS: ICD-10-CM

## 2020-06-16 DIAGNOSIS — D68.59 HYPERCOAGULABLE STATE (HCC): ICD-10-CM

## 2020-06-16 PROCEDURE — 93793 ANTICOAG MGMT PT WARFARIN: CPT

## 2020-06-16 PROCEDURE — 36416 COLLJ CAPILLARY BLOOD SPEC: CPT

## 2020-06-16 PROCEDURE — 85610 PROTHROMBIN TIME: CPT

## 2020-06-16 NOTE — TELEPHONE ENCOUNTER
Today's INR 4.7  Goal 2.0-3.0    Per protocol, hold 2 doses of coumadin, decrease weekly dose by 20% and repeat INR in 5-7 days. Actual hold 2 doses and weekly dose decreased 5.3% as INR trend has been to increase or drop quickly with dose adjustments.

## 2020-06-22 ENCOUNTER — ANTI-COAG VISIT (OUTPATIENT)
Dept: INTERNAL MEDICINE CLINIC | Facility: CLINIC | Age: 35
End: 2020-06-22
Payer: COMMERCIAL

## 2020-06-22 ENCOUNTER — TELEPHONE (OUTPATIENT)
Dept: INTERNAL MEDICINE CLINIC | Facility: CLINIC | Age: 35
End: 2020-06-22

## 2020-06-22 DIAGNOSIS — I63.019 CEREBROVASCULAR ACCIDENT (CVA) DUE TO THROMBOSIS OF VERTEBRAL ARTERY, UNSPECIFIED BLOOD VESSEL LATERALITY (HCC): ICD-10-CM

## 2020-06-22 DIAGNOSIS — G08 THROMBOSIS TRANSVERSE SINUS: ICD-10-CM

## 2020-06-22 DIAGNOSIS — G08 DURAL VENOUS SINUS THROMBOSIS: ICD-10-CM

## 2020-06-22 DIAGNOSIS — D68.59 HYPERCOAGULABLE STATE (HCC): ICD-10-CM

## 2020-06-22 DIAGNOSIS — Z79.01 LONG TERM (CURRENT) USE OF ANTICOAGULANTS: ICD-10-CM

## 2020-06-22 DIAGNOSIS — Z51.81 ENCOUNTER FOR THERAPEUTIC DRUG MONITORING: ICD-10-CM

## 2020-06-22 PROCEDURE — 36416 COLLJ CAPILLARY BLOOD SPEC: CPT

## 2020-06-22 PROCEDURE — 93793 ANTICOAG MGMT PT WARFARIN: CPT

## 2020-06-22 PROCEDURE — 85610 PROTHROMBIN TIME: CPT

## 2020-06-22 NOTE — TELEPHONE ENCOUNTER
Today's INR 1.5  Goal 2.0-3.0    Per protocol, patient to take 2 extra doses and repeat INR in 1 week. Patient INR trend is to increase/decrease with even small adjustments to dosing.      Instructed to take one extra dose tonight and continue to follow

## 2020-06-30 ENCOUNTER — HOSPITAL ENCOUNTER (OUTPATIENT)
Age: 35
Discharge: STILL ADMITTED OR ADMINISTRATIVE | End: 2020-07-01
Payer: COMMERCIAL

## 2020-07-06 ENCOUNTER — ANTI-COAG VISIT (OUTPATIENT)
Dept: INTERNAL MEDICINE CLINIC | Facility: CLINIC | Age: 35
End: 2020-07-06
Payer: COMMERCIAL

## 2020-07-06 ENCOUNTER — TELEPHONE (OUTPATIENT)
Dept: INTERNAL MEDICINE CLINIC | Facility: CLINIC | Age: 35
End: 2020-07-06

## 2020-07-06 DIAGNOSIS — G08 THROMBOSIS TRANSVERSE SINUS: ICD-10-CM

## 2020-07-06 DIAGNOSIS — Z79.01 LONG TERM (CURRENT) USE OF ANTICOAGULANTS: ICD-10-CM

## 2020-07-06 DIAGNOSIS — G08 DURAL VENOUS SINUS THROMBOSIS: ICD-10-CM

## 2020-07-06 DIAGNOSIS — Z51.81 ENCOUNTER FOR THERAPEUTIC DRUG MONITORING: ICD-10-CM

## 2020-07-06 DIAGNOSIS — D68.59 HYPERCOAGULABLE STATE (HCC): ICD-10-CM

## 2020-07-06 DIAGNOSIS — I63.019 CEREBROVASCULAR ACCIDENT (CVA) DUE TO THROMBOSIS OF VERTEBRAL ARTERY, UNSPECIFIED BLOOD VESSEL LATERALITY (HCC): ICD-10-CM

## 2020-07-06 LAB
INR: 4.1 (ref 0.8–1.2)
TEST STRIP EXPIRATION DATE: ABNORMAL DATE

## 2020-07-06 PROCEDURE — 93793 ANTICOAG MGMT PT WARFARIN: CPT

## 2020-07-06 PROCEDURE — 85610 PROTHROMBIN TIME: CPT

## 2020-07-06 PROCEDURE — 36416 COLLJ CAPILLARY BLOOD SPEC: CPT

## 2020-07-06 NOTE — TELEPHONE ENCOUNTER
Today's INR 4.1  Goal 2.0-3.0    Per protocol, patient to hold 2 doses, weekly dose decrease by 20% and repeat INR in 1 week. Patient INR trend is to increase/decrease with even small adjustments to dosing.  Patient has been eating less greens/veggies a

## 2020-07-08 ENCOUNTER — TELEPHONE (OUTPATIENT)
Dept: ALLERGY | Facility: CLINIC | Age: 35
End: 2020-07-08

## 2020-07-21 ENCOUNTER — PATIENT MESSAGE (OUTPATIENT)
Dept: INTERNAL MEDICINE CLINIC | Facility: CLINIC | Age: 35
End: 2020-07-21

## 2020-07-21 ENCOUNTER — TELEPHONE (OUTPATIENT)
Dept: HEMATOLOGY/ONCOLOGY | Facility: HOSPITAL | Age: 35
End: 2020-07-21

## 2020-07-21 DIAGNOSIS — O09.90 SUPERVISION OF HIGH RISK PREGNANCY, ANTEPARTUM: Primary | ICD-10-CM

## 2020-07-21 RX ORDER — WARFARIN SODIUM 1 MG/1
TABLET ORAL
Qty: 90 TABLET | Refills: 0 | Status: SHIPPED | OUTPATIENT
Start: 2020-07-21

## 2020-07-21 RX ORDER — WARFARIN SODIUM 5 MG/1
TABLET ORAL
Qty: 45 TABLET | Refills: 0 | Status: SHIPPED | OUTPATIENT
Start: 2020-07-21 | End: 2020-12-04

## 2020-07-21 NOTE — TELEPHONE ENCOUNTER
Returned phone call. Pt inquiring about Lovenox injections to give prior to insemination.  Discussed recommendation by Dr. Nilam Ritchie was to stop coumadin 3-4 days before insemination,  start lovenox bridge at the time and then through pregnancy, and should be fol

## 2020-07-21 NOTE — TELEPHONE ENCOUNTER
Carroll Smith called saying she is starting insemination, and she needs to go over some of her medication with .  Please call

## 2020-07-21 NOTE — TELEPHONE ENCOUNTER
From: Belkys Mccrary  To: Maki Palacios MD  Sent: 7/21/2020 1:00 PM CDT  Subject: Other    Hello Dr. Jose Gurrola all is well and that you and the family are safe in these trying times. My family and I appreciate all that you do. Thank you again.     So in

## 2020-07-22 ENCOUNTER — TELEPHONE (OUTPATIENT)
Dept: INTERNAL MEDICINE CLINIC | Facility: CLINIC | Age: 35
End: 2020-07-22

## 2020-07-22 ENCOUNTER — ANTI-COAG VISIT (OUTPATIENT)
Dept: INTERNAL MEDICINE CLINIC | Facility: CLINIC | Age: 35
End: 2020-07-22
Payer: COMMERCIAL

## 2020-07-22 DIAGNOSIS — Z51.81 ENCOUNTER FOR THERAPEUTIC DRUG MONITORING: ICD-10-CM

## 2020-07-22 DIAGNOSIS — I63.019 CEREBROVASCULAR ACCIDENT (CVA) DUE TO THROMBOSIS OF VERTEBRAL ARTERY, UNSPECIFIED BLOOD VESSEL LATERALITY (HCC): ICD-10-CM

## 2020-07-22 DIAGNOSIS — G08 DURAL VENOUS SINUS THROMBOSIS: ICD-10-CM

## 2020-07-22 DIAGNOSIS — D68.59 HYPERCOAGULABLE STATE (HCC): ICD-10-CM

## 2020-07-22 DIAGNOSIS — Z79.01 LONG TERM (CURRENT) USE OF ANTICOAGULANTS: ICD-10-CM

## 2020-07-22 DIAGNOSIS — G08 THROMBOSIS TRANSVERSE SINUS: ICD-10-CM

## 2020-07-22 LAB
INR: 3.5 (ref 0.8–1.2)
TEST STRIP EXPIRATION DATE: ABNORMAL DATE

## 2020-07-22 PROCEDURE — 36416 COLLJ CAPILLARY BLOOD SPEC: CPT

## 2020-07-22 PROCEDURE — 85610 PROTHROMBIN TIME: CPT

## 2020-07-22 PROCEDURE — 93793 ANTICOAG MGMT PT WARFARIN: CPT

## 2020-07-22 NOTE — TELEPHONE ENCOUNTER
Today's INR 3.5  Goal 2.0-3.0    Per protocol, weekly dose decrease by 10%- actual decrease 2.8%. Repeat INR in 2 weeks.

## 2020-07-22 NOTE — TELEPHONE ENCOUNTER
I spoke with pt, she needs high risk pregnancy specialist, do we know anyone like that, ofr should she be referred to [facility] ?

## 2020-07-23 ENCOUNTER — TELEPHONE (OUTPATIENT)
Dept: OBGYN CLINIC | Facility: CLINIC | Age: 35
End: 2020-07-23

## 2020-07-23 NOTE — TELEPHONE ENCOUNTER
So she has his of thrombosis or dural sinus and was on coumadin now started on Lovenox during preganacy

## 2020-07-23 NOTE — TELEPHONE ENCOUNTER
FEDERICO Godinez called from Dr. Andi Medeiros office. Pt has Thrombosis of Dural Sinus. RN stated that pt is trying to get pregnant. Dr. Sherry Patel wants to know if pt gets pregnant would our group accept her with having Thrombosis of Dural Sinus.

## 2020-07-23 NOTE — TELEPHONE ENCOUNTER
I spoke to [facility] in OB/GYN triage. She will send request to their physicians.

## 2020-07-24 NOTE — TELEPHONE ENCOUNTER
[facility] spoke to nurse James Rutherford of rec below. James Rutherford will forward message to Dr Ally Contreras.

## 2020-07-27 NOTE — TELEPHONE ENCOUNTER
So she will need to go to [facility] for high risk pregnancy, can we check with managed care if this is ok how to order the referral

## 2020-07-30 NOTE — TELEPHONE ENCOUNTER
Please call [facility] tomorrow to get advice on how to proceed. Pt [place]?  Will she need a referral?

## 2020-07-30 NOTE — TELEPHONE ENCOUNTER
Can e call managed car to see what we need to refer the pt to [facility] for high risk pregnancy

## 2020-07-30 NOTE — TELEPHONE ENCOUNTER
Left message for Lay at [facility]. Requesting assistance on referral for patient to go to [facility] for high risk pregnancy.

## 2020-07-31 ENCOUNTER — OFFICE VISIT (OUTPATIENT)
Dept: HEMATOLOGY/ONCOLOGY | Facility: HOSPITAL | Age: 35
End: 2020-07-31
Attending: INTERNAL MEDICINE
Payer: COMMERCIAL

## 2020-07-31 VITALS
WEIGHT: 167 LBS | BODY MASS INDEX: 27.82 KG/M2 | SYSTOLIC BLOOD PRESSURE: 111 MMHG | HEIGHT: 65 IN | OXYGEN SATURATION: 95 % | DIASTOLIC BLOOD PRESSURE: 75 MMHG | TEMPERATURE: 97 F | RESPIRATION RATE: 16 BRPM | HEART RATE: 104 BPM

## 2020-07-31 DIAGNOSIS — D68.59 HYPERCOAGULABLE STATE (HCC): Primary | ICD-10-CM

## 2020-07-31 PROCEDURE — 99214 OFFICE O/P EST MOD 30 MIN: CPT | Performed by: INTERNAL MEDICINE

## 2020-07-31 NOTE — TELEPHONE ENCOUNTER
Hello,    Patient has PPO health plan no referrals are required. Patient will need to contact [facility] directly to verify if coverage is available at [facility]. Please note  does not handle O health plan for office visits, DME or procedures.   only handles rad

## 2020-07-31 NOTE — PROGRESS NOTES
HPI     Myriam Patterson is a 28year old female here for f/u of a Hypercoagulable state (hcc)  (primary encounter diagnosis)   Patient last seen in Sept 2019. She is planning pregnancy and is here for advise for anticoagulation.     States having insemina 7/31/2020 ) 1 Bottle 0   • Enoxaparin Sodium (LOVENOX) 80 MG/0.8ML Subcutaneous Solution Inject 0.8 mL (80 mg total) into the skin 2 (two) times daily.  (Patient not taking: Reported on 7/31/2020 ) 15 Syringe 0   • Enoxaparin Sodium 80 MG/0.8ML Subcutaneous palpable lymphadenopathy. Neck is supple. Chest: Clear to auscultation. Heart: Regular rate and rhythm. Abdomen: Soft, non tender with good bowel sounds. Extremities: No edema.          ASSESSMENT/PLAN:   Hypercoagulable state (hcc)  (primary encounter coumadin 3-4 days prior to procedure and start on lovenox bridge at the time and then through out pregnancy. Dose of lovenox should be therapeutic dose of 1 mg/kg sc bid.   (76 mg every 12 hrs)    She has prescription from Sept 2019 for 80 mg/ml of enoxe

## 2020-07-31 NOTE — PATIENT INSTRUCTIONS
Hold coumadin when planning to get inseminated. Hold dose starting on 08/06/2020. Inject 0.76 ml of enoxeparin (lovenox) every 12 hours after stop coumadin (08/07/20) and continue through out pregnancy.   May resume coumadin after delivery with lovenox

## 2020-08-11 RX ORDER — ENOXAPARIN SODIUM 100 MG/ML
INJECTION SUBCUTANEOUS
Qty: 16 ML | Refills: 0 | Status: SHIPPED | OUTPATIENT
Start: 2020-08-11 | End: 2020-08-21

## 2020-08-18 ENCOUNTER — TELEPHONE (OUTPATIENT)
Dept: CARDIOLOGY CLINIC | Facility: CLINIC | Age: 35
End: 2020-08-18

## 2020-08-21 ENCOUNTER — TELEPHONE (OUTPATIENT)
Dept: HEMATOLOGY/ONCOLOGY | Facility: HOSPITAL | Age: 35
End: 2020-08-21

## 2020-08-21 RX ORDER — ENOXAPARIN SODIUM 100 MG/ML
INJECTION SUBCUTANEOUS
Qty: 16 ML | Refills: 0 | Status: SHIPPED | OUTPATIENT
Start: 2020-08-21 | End: 2020-08-26

## 2020-08-21 NOTE — TELEPHONE ENCOUNTER
Call placed to [facility]. Received refill request for Lovenox. Dr Joey Delgado wants to know plan, does she need refill. Does she have appt with fertility MD etc. Please call back with plan.

## 2020-08-26 RX ORDER — ENOXAPARIN SODIUM 100 MG/ML
INJECTION SUBCUTANEOUS
Qty: 16 ML | Refills: 0 | Status: SHIPPED | OUTPATIENT
Start: 2020-08-26 | End: 2020-09-12

## 2020-08-28 ENCOUNTER — TELEPHONE (OUTPATIENT)
Dept: HEMATOLOGY/ONCOLOGY | Facility: HOSPITAL | Age: 35
End: 2020-08-28

## 2020-08-28 NOTE — TELEPHONE ENCOUNTER
Called pt again, she did answer. She is in the process af trying to get pregnant. She is taking the lovenox injections. She had a partial fill last week and has enough to go thru til Monday.  I told her Bobby is requesting a prior auth and I did not kno

## 2020-09-12 RX ORDER — ENOXAPARIN SODIUM 100 MG/ML
INJECTION SUBCUTANEOUS
Qty: 16 ML | Refills: 0 | Status: SHIPPED | OUTPATIENT
Start: 2020-09-12 | End: 2021-02-22

## 2020-09-15 RX ORDER — ENOXAPARIN SODIUM 100 MG/ML
79 INJECTION SUBCUTANEOUS 2 TIMES DAILY
Qty: 16 ML | Refills: 0 | OUTPATIENT
Start: 2020-09-15

## 2020-09-16 ENCOUNTER — TELEPHONE (OUTPATIENT)
Dept: INTERNAL MEDICINE CLINIC | Facility: CLINIC | Age: 35
End: 2020-09-16

## 2020-09-16 NOTE — TELEPHONE ENCOUNTER
Chart reviewed, last INR 7/22/20. Patient is attempting to get pregnant, refer to office visit with Dr. Omari Prasad on 7/31- provides instructions on anticoagulation during this process. Called and spoke with Ingrid Shah- she is currently taking lovenox.  She verbal

## 2020-09-17 ENCOUNTER — TELEPHONE (OUTPATIENT)
Dept: INTERNAL MEDICINE CLINIC | Facility: CLINIC | Age: 35
End: 2020-09-17

## 2020-09-17 RX ORDER — WARFARIN SODIUM 1 MG/1
TABLET ORAL
Qty: 90 TABLET | Refills: 0 | Status: SHIPPED | OUTPATIENT
Start: 2020-09-17 | End: 2021-05-18

## 2020-09-24 RX ORDER — ENOXAPARIN SODIUM 100 MG/ML
INJECTION SUBCUTANEOUS
Qty: 16 ML | Refills: 0 | OUTPATIENT
Start: 2020-09-24

## 2020-09-24 RX ORDER — ENOXAPARIN SODIUM 100 MG/ML
79 INJECTION SUBCUTANEOUS 2 TIMES DAILY
Qty: 16 ML | Refills: 0 | OUTPATIENT
Start: 2020-09-24

## 2020-09-28 ENCOUNTER — TELEPHONE (OUTPATIENT)
Dept: HEMATOLOGY/ONCOLOGY | Facility: HOSPITAL | Age: 35
End: 2020-09-28

## 2020-09-28 NOTE — TELEPHONE ENCOUNTER
Dr. Celestina Millan is calling to speak to Dr. Kaylene Cunningham regarding a procedure she need to have done in order to get pregnant.

## 2020-09-29 ENCOUNTER — TELEPHONE (OUTPATIENT)
Dept: HEMATOLOGY/ONCOLOGY | Facility: HOSPITAL | Age: 35
End: 2020-09-29

## 2020-09-29 NOTE — TELEPHONE ENCOUNTER
[facility] says they are trying to get a hold of  regarding the patient's lovenox. She asks that  calls  [phone] ext [phone].  Please call

## 2020-09-30 RX ORDER — ENOXAPARIN SODIUM 100 MG/ML
1 INJECTION SUBCUTANEOUS 2 TIMES DAILY
Qty: 15 SYRINGE | Refills: 0 | OUTPATIENT
Start: 2020-09-30

## 2020-09-30 RX ORDER — ENOXAPARIN SODIUM 100 MG/ML
1 INJECTION SUBCUTANEOUS 2 TIMES DAILY
Qty: 15 SYRINGE | Refills: 0 | Status: SHIPPED | OUTPATIENT
Start: 2020-09-30 | End: 2021-02-22

## 2020-09-30 RX ORDER — ENOXAPARIN SODIUM 100 MG/ML
79 INJECTION SUBCUTANEOUS 2 TIMES DAILY
Qty: 16 ML | Refills: 0 | OUTPATIENT
Start: 2020-09-30

## 2020-10-19 DIAGNOSIS — J30.2 PERENNIAL ALLERGIC RHINITIS WITH SEASONAL VARIATION: ICD-10-CM

## 2020-10-19 DIAGNOSIS — J30.89 PERENNIAL ALLERGIC RHINITIS WITH SEASONAL VARIATION: ICD-10-CM

## 2020-10-19 DIAGNOSIS — J30.89 ENVIRONMENTAL AND SEASONAL ALLERGIES: ICD-10-CM

## 2020-10-19 RX ORDER — MONTELUKAST SODIUM 10 MG/1
10 TABLET ORAL NIGHTLY
Qty: 90 TABLET | Refills: 0 | Status: SHIPPED | OUTPATIENT
Start: 2020-10-19 | End: 2021-02-01

## 2020-10-19 NOTE — TELEPHONE ENCOUNTER
Patient last seen for Allergy Telemedicine Visit on 5/6/2020 for . . . Perennial allergic rhinitis with seasonal variation  (primary encounter diagnosis)  Environmental and seasonal allergies     Refill request received today via fax for .  Bebo Orf

## 2020-10-20 NOTE — TELEPHONE ENCOUNTER
Chart reviewed. Refer to TE from 9/30- Dr. Kaylene Cunningham recommends for the patient to go back on coumadin. Called and spoke with Kash Murillo. States that she has not yet started the coumadin but is now ready to.  Advised to start at her previous dosing- 5mg daily- for

## 2020-10-26 ENCOUNTER — ANTI-COAG VISIT (OUTPATIENT)
Dept: INTERNAL MEDICINE CLINIC | Facility: CLINIC | Age: 35
End: 2020-10-26
Payer: COMMERCIAL

## 2020-10-26 ENCOUNTER — TELEPHONE (OUTPATIENT)
Dept: INTERNAL MEDICINE CLINIC | Facility: CLINIC | Age: 35
End: 2020-10-26

## 2020-10-26 DIAGNOSIS — Z51.81 ENCOUNTER FOR THERAPEUTIC DRUG MONITORING: ICD-10-CM

## 2020-10-26 DIAGNOSIS — G08 DURAL VENOUS SINUS THROMBOSIS: ICD-10-CM

## 2020-10-26 DIAGNOSIS — G08 THROMBOSIS TRANSVERSE SINUS: ICD-10-CM

## 2020-10-26 DIAGNOSIS — D68.59 HYPERCOAGULABLE STATE (HCC): ICD-10-CM

## 2020-10-26 DIAGNOSIS — Z79.01 LONG TERM (CURRENT) USE OF ANTICOAGULANTS: ICD-10-CM

## 2020-10-26 DIAGNOSIS — I63.00 CEREBROVASCULAR ACCIDENT (CVA) DUE TO THROMBOSIS OF PRECEREBRAL ARTERY (HCC): ICD-10-CM

## 2020-10-26 DIAGNOSIS — I63.00 CEREBROVASCULAR ACCIDENT (CVA) DUE TO THROMBOSIS OF PRECEREBRAL ARTERY (HCC): Primary | ICD-10-CM

## 2020-10-26 PROCEDURE — 36416 COLLJ CAPILLARY BLOOD SPEC: CPT

## 2020-10-26 PROCEDURE — 93793 ANTICOAG MGMT PT WARFARIN: CPT

## 2020-10-26 PROCEDURE — 85610 PROTHROMBIN TIME: CPT

## 2020-10-26 NOTE — TELEPHONE ENCOUNTER
Anticoag referral expires soon. Order pended. Please sign, thank you.       Dx: Cerebrovascular accident (CVA) due to thrombosis of precerebral artery ([facility] [address] (I63.00)  Encounter for therapeutic drug monitoring (Z51.81)  Long term (current) use of anticoagula

## 2020-11-02 ENCOUNTER — ANTI-COAG VISIT (OUTPATIENT)
Dept: INTERNAL MEDICINE CLINIC | Facility: CLINIC | Age: 35
End: 2020-11-02
Payer: COMMERCIAL

## 2020-11-02 DIAGNOSIS — Z51.81 ENCOUNTER FOR THERAPEUTIC DRUG MONITORING: ICD-10-CM

## 2020-11-02 DIAGNOSIS — Z79.01 LONG TERM (CURRENT) USE OF ANTICOAGULANTS: ICD-10-CM

## 2020-11-02 DIAGNOSIS — G08 THROMBOSIS TRANSVERSE SINUS: ICD-10-CM

## 2020-11-02 DIAGNOSIS — D68.59 HYPERCOAGULABLE STATE (HCC): ICD-10-CM

## 2020-11-02 DIAGNOSIS — G08 DURAL VENOUS SINUS THROMBOSIS: ICD-10-CM

## 2020-11-02 DIAGNOSIS — I63.00 CEREBROVASCULAR ACCIDENT (CVA) DUE TO THROMBOSIS OF PRECEREBRAL ARTERY (HCC): ICD-10-CM

## 2020-11-02 PROCEDURE — 85610 PROTHROMBIN TIME: CPT

## 2020-11-02 PROCEDURE — 93793 ANTICOAG MGMT PT WARFARIN: CPT

## 2020-11-02 PROCEDURE — 36416 COLLJ CAPILLARY BLOOD SPEC: CPT

## 2020-11-11 ENCOUNTER — ANTI-COAG VISIT (OUTPATIENT)
Dept: INTERNAL MEDICINE CLINIC | Facility: CLINIC | Age: 35
End: 2020-11-11
Payer: COMMERCIAL

## 2020-11-11 DIAGNOSIS — Z79.01 LONG TERM (CURRENT) USE OF ANTICOAGULANTS: ICD-10-CM

## 2020-11-11 DIAGNOSIS — D68.59 HYPERCOAGULABLE STATE (HCC): ICD-10-CM

## 2020-11-11 DIAGNOSIS — I63.00 CEREBROVASCULAR ACCIDENT (CVA) DUE TO THROMBOSIS OF PRECEREBRAL ARTERY (HCC): ICD-10-CM

## 2020-11-11 DIAGNOSIS — Z51.81 ENCOUNTER FOR THERAPEUTIC DRUG MONITORING: ICD-10-CM

## 2020-11-11 DIAGNOSIS — G08 DURAL VENOUS SINUS THROMBOSIS: ICD-10-CM

## 2020-11-11 DIAGNOSIS — G08 THROMBOSIS TRANSVERSE SINUS: ICD-10-CM

## 2020-11-11 PROCEDURE — 93793 ANTICOAG MGMT PT WARFARIN: CPT

## 2020-11-11 PROCEDURE — 85610 PROTHROMBIN TIME: CPT

## 2020-11-11 PROCEDURE — 36416 COLLJ CAPILLARY BLOOD SPEC: CPT

## 2020-11-30 ENCOUNTER — ANTI-COAG VISIT (OUTPATIENT)
Dept: INTERNAL MEDICINE CLINIC | Facility: CLINIC | Age: 35
End: 2020-11-30
Payer: COMMERCIAL

## 2020-11-30 DIAGNOSIS — D68.59 HYPERCOAGULABLE STATE (HCC): ICD-10-CM

## 2020-11-30 DIAGNOSIS — G08 DURAL VENOUS SINUS THROMBOSIS: ICD-10-CM

## 2020-11-30 DIAGNOSIS — G08 THROMBOSIS TRANSVERSE SINUS: ICD-10-CM

## 2020-11-30 DIAGNOSIS — Z79.01 LONG TERM (CURRENT) USE OF ANTICOAGULANTS: ICD-10-CM

## 2020-11-30 DIAGNOSIS — I63.00 CEREBROVASCULAR ACCIDENT (CVA) DUE TO THROMBOSIS OF PRECEREBRAL ARTERY (HCC): ICD-10-CM

## 2020-11-30 DIAGNOSIS — Z51.81 ENCOUNTER FOR THERAPEUTIC DRUG MONITORING: ICD-10-CM

## 2020-11-30 PROCEDURE — 93793 ANTICOAG MGMT PT WARFARIN: CPT

## 2020-11-30 PROCEDURE — 36416 COLLJ CAPILLARY BLOOD SPEC: CPT

## 2020-11-30 PROCEDURE — 85610 PROTHROMBIN TIME: CPT

## 2020-12-04 RX ORDER — WARFARIN SODIUM 5 MG/1
TABLET ORAL
Qty: 45 TABLET | Refills: 0 | Status: SHIPPED | OUTPATIENT
Start: 2020-12-04 | End: 2021-02-01

## 2020-12-15 ENCOUNTER — ANTI-COAG VISIT (OUTPATIENT)
Dept: INTERNAL MEDICINE CLINIC | Facility: CLINIC | Age: 35
End: 2020-12-15
Payer: COMMERCIAL

## 2020-12-15 DIAGNOSIS — I63.00 CEREBROVASCULAR ACCIDENT (CVA) DUE TO THROMBOSIS OF PRECEREBRAL ARTERY (HCC): ICD-10-CM

## 2020-12-15 DIAGNOSIS — G08 DURAL VENOUS SINUS THROMBOSIS: ICD-10-CM

## 2020-12-15 DIAGNOSIS — D68.59 HYPERCOAGULABLE STATE (HCC): ICD-10-CM

## 2020-12-15 DIAGNOSIS — Z51.81 ENCOUNTER FOR THERAPEUTIC DRUG MONITORING: ICD-10-CM

## 2020-12-15 DIAGNOSIS — Z79.01 LONG TERM (CURRENT) USE OF ANTICOAGULANTS: ICD-10-CM

## 2020-12-15 DIAGNOSIS — G08 THROMBOSIS TRANSVERSE SINUS: ICD-10-CM

## 2020-12-15 PROCEDURE — 36416 COLLJ CAPILLARY BLOOD SPEC: CPT

## 2020-12-15 PROCEDURE — 93793 ANTICOAG MGMT PT WARFARIN: CPT

## 2020-12-15 PROCEDURE — 85610 PROTHROMBIN TIME: CPT

## 2020-12-29 ENCOUNTER — TELEPHONE (OUTPATIENT)
Dept: INTERNAL MEDICINE CLINIC | Facility: CLINIC | Age: 35
End: 2020-12-29

## 2020-12-29 NOTE — TELEPHONE ENCOUNTER
Patient missed [facility] appointment scheduled for this morning. Called and left message for Cathy Pink to return call to [facility] to reschedule for next week- as appointment slots are booked up this week due to the holiday.

## 2021-01-12 ENCOUNTER — ANTI-COAG VISIT (OUTPATIENT)
Dept: INTERNAL MEDICINE CLINIC | Facility: CLINIC | Age: 36
End: 2021-01-12
Payer: COMMERCIAL

## 2021-01-12 DIAGNOSIS — D68.59 HYPERCOAGULABLE STATE (HCC): ICD-10-CM

## 2021-01-12 DIAGNOSIS — Z79.01 LONG TERM (CURRENT) USE OF ANTICOAGULANTS: ICD-10-CM

## 2021-01-12 DIAGNOSIS — I63.00 CEREBROVASCULAR ACCIDENT (CVA) DUE TO THROMBOSIS OF PRECEREBRAL ARTERY (HCC): ICD-10-CM

## 2021-01-12 DIAGNOSIS — G08 DURAL VENOUS SINUS THROMBOSIS: ICD-10-CM

## 2021-01-12 DIAGNOSIS — G08 THROMBOSIS TRANSVERSE SINUS: ICD-10-CM

## 2021-01-12 DIAGNOSIS — Z51.81 ENCOUNTER FOR THERAPEUTIC DRUG MONITORING: ICD-10-CM

## 2021-01-12 LAB
INR: 1.6 (ref 0.8–1.2)
TEST STRIP EXPIRATION DATE: ABNORMAL DATE

## 2021-01-12 PROCEDURE — 93793 ANTICOAG MGMT PT WARFARIN: CPT

## 2021-01-12 PROCEDURE — 36416 COLLJ CAPILLARY BLOOD SPEC: CPT

## 2021-01-12 PROCEDURE — 85610 PROTHROMBIN TIME: CPT

## 2021-01-30 DIAGNOSIS — J30.89 ENVIRONMENTAL AND SEASONAL ALLERGIES: ICD-10-CM

## 2021-01-30 DIAGNOSIS — J30.2 PERENNIAL ALLERGIC RHINITIS WITH SEASONAL VARIATION: ICD-10-CM

## 2021-01-30 DIAGNOSIS — J30.89 PERENNIAL ALLERGIC RHINITIS WITH SEASONAL VARIATION: ICD-10-CM

## 2021-02-01 RX ORDER — MONTELUKAST SODIUM 10 MG/1
TABLET ORAL
Qty: 90 TABLET | Refills: 0 | Status: SHIPPED | OUTPATIENT
Start: 2021-02-01 | End: 2021-02-22

## 2021-02-01 NOTE — TELEPHONE ENCOUNTER
Patient last seen in Allergy (Telemedicine Visit) on 5/6/2020 for . . . Perennial allergic rhinitis with seasonal variation  (primary encounter diagnosis)  Environmental and seasonal allergies     Refill request received for .  . .    Montelukast Sodium

## 2021-02-02 RX ORDER — WARFARIN SODIUM 5 MG/1
TABLET ORAL
Qty: 45 TABLET | Refills: 0 | Status: SHIPPED | OUTPATIENT
Start: 2021-02-02 | End: 2021-02-07

## 2021-02-07 RX ORDER — WARFARIN SODIUM 5 MG/1
TABLET ORAL
Qty: 45 TABLET | Refills: 0 | Status: SHIPPED | OUTPATIENT
Start: 2021-02-07 | End: 2021-05-18

## 2021-02-08 RX ORDER — WARFARIN SODIUM 5 MG/1
TABLET ORAL
Qty: 45 TABLET | Refills: 0 | OUTPATIENT
Start: 2021-02-08

## 2021-02-09 ENCOUNTER — ANTI-COAG VISIT (OUTPATIENT)
Dept: INTERNAL MEDICINE CLINIC | Facility: CLINIC | Age: 36
End: 2021-02-09
Payer: COMMERCIAL

## 2021-02-09 DIAGNOSIS — I63.00 CEREBROVASCULAR ACCIDENT (CVA) DUE TO THROMBOSIS OF PRECEREBRAL ARTERY (HCC): ICD-10-CM

## 2021-02-09 DIAGNOSIS — G08 DURAL VENOUS SINUS THROMBOSIS: ICD-10-CM

## 2021-02-09 DIAGNOSIS — Z79.01 LONG TERM (CURRENT) USE OF ANTICOAGULANTS: ICD-10-CM

## 2021-02-09 DIAGNOSIS — D68.59 HYPERCOAGULABLE STATE (HCC): ICD-10-CM

## 2021-02-09 DIAGNOSIS — G08 THROMBOSIS TRANSVERSE SINUS: ICD-10-CM

## 2021-02-09 DIAGNOSIS — Z51.81 ENCOUNTER FOR THERAPEUTIC DRUG MONITORING: ICD-10-CM

## 2021-02-09 LAB
INR: 1.7 (ref 0.8–1.2)
TEST STRIP EXPIRATION DATE: ABNORMAL DATE

## 2021-02-09 PROCEDURE — 85610 PROTHROMBIN TIME: CPT

## 2021-02-09 PROCEDURE — 36416 COLLJ CAPILLARY BLOOD SPEC: CPT

## 2021-02-09 PROCEDURE — 93793 ANTICOAG MGMT PT WARFARIN: CPT

## 2021-02-14 ENCOUNTER — TELEPHONE (OUTPATIENT)
Dept: INTERNAL MEDICINE CLINIC | Facility: CLINIC | Age: 36
End: 2021-02-14

## 2021-02-22 ENCOUNTER — OFFICE VISIT (OUTPATIENT)
Dept: INTERNAL MEDICINE CLINIC | Facility: CLINIC | Age: 36
End: 2021-02-22
Payer: COMMERCIAL

## 2021-02-22 VITALS
HEART RATE: 72 BPM | DIASTOLIC BLOOD PRESSURE: 80 MMHG | SYSTOLIC BLOOD PRESSURE: 112 MMHG | OXYGEN SATURATION: 99 % | HEIGHT: 65 IN | BODY MASS INDEX: 29.82 KG/M2 | WEIGHT: 179 LBS | RESPIRATION RATE: 18 BRPM | TEMPERATURE: 98 F

## 2021-02-22 DIAGNOSIS — J30.89 ENVIRONMENTAL AND SEASONAL ALLERGIES: ICD-10-CM

## 2021-02-22 DIAGNOSIS — Z79.01 LONG TERM (CURRENT) USE OF ANTICOAGULANTS: ICD-10-CM

## 2021-02-22 DIAGNOSIS — K64.9 HEMORRHOIDS, UNSPECIFIED HEMORRHOID TYPE: ICD-10-CM

## 2021-02-22 DIAGNOSIS — F41.9 ANXIETY: ICD-10-CM

## 2021-02-22 DIAGNOSIS — E78.5 HYPERLIPIDEMIA, UNSPECIFIED HYPERLIPIDEMIA TYPE: ICD-10-CM

## 2021-02-22 DIAGNOSIS — G08 DURAL VENOUS SINUS THROMBOSIS: ICD-10-CM

## 2021-02-22 DIAGNOSIS — J30.89 PERENNIAL ALLERGIC RHINITIS WITH SEASONAL VARIATION: ICD-10-CM

## 2021-02-22 DIAGNOSIS — Z86.73 HISTORY OF CVA IN ADULTHOOD: ICD-10-CM

## 2021-02-22 DIAGNOSIS — J30.2 PERENNIAL ALLERGIC RHINITIS WITH SEASONAL VARIATION: ICD-10-CM

## 2021-02-22 DIAGNOSIS — Z00.00 ANNUAL PHYSICAL EXAM: Primary | ICD-10-CM

## 2021-02-22 LAB
ALBUMIN SERPL-MCNC: 3.7 G/DL (ref 3.4–5)
ALBUMIN/GLOB SERPL: 1.1 {RATIO} (ref 1–2)
ALP LIVER SERPL-CCNC: 66 U/L
ALT SERPL-CCNC: 23 U/L
ANION GAP SERPL CALC-SCNC: 4 MMOL/L (ref 0–18)
AST SERPL-CCNC: 16 U/L (ref 15–37)
BASOPHILS # BLD AUTO: 0.06 X10(3) UL (ref 0–0.2)
BASOPHILS NFR BLD AUTO: 0.8 %
BILIRUB SERPL-MCNC: 0.2 MG/DL (ref 0.1–2)
BILIRUB UR QL: NEGATIVE
BUN BLD-MCNC: 8 MG/DL (ref 7–18)
BUN/CREAT SERPL: 11 (ref 10–20)
CALCIUM BLD-MCNC: 9.3 MG/DL (ref 8.5–10.1)
CHLORIDE SERPL-SCNC: 109 MMOL/L (ref 98–112)
CHOLEST SMN-MCNC: 208 MG/DL (ref ?–200)
CO2 SERPL-SCNC: 28 MMOL/L (ref 21–32)
COLOR UR: YELLOW
CREAT BLD-MCNC: 0.73 MG/DL
DEPRECATED RDW RBC AUTO: 49.2 FL (ref 35.1–46.3)
EOSINOPHIL # BLD AUTO: 0.18 X10(3) UL (ref 0–0.7)
EOSINOPHIL NFR BLD AUTO: 2.3 %
ERYTHROCYTE [DISTWIDTH] IN BLOOD BY AUTOMATED COUNT: 14.4 % (ref 11–15)
GLOBULIN PLAS-MCNC: 3.5 G/DL (ref 2.8–4.4)
GLUCOSE BLD-MCNC: 83 MG/DL (ref 70–99)
GLUCOSE UR-MCNC: NEGATIVE MG/DL
HCT VFR BLD AUTO: 43.5 %
HDLC SERPL-MCNC: 46 MG/DL (ref 40–59)
HGB BLD-MCNC: 13.8 G/DL
HGB UR QL STRIP.AUTO: NEGATIVE
IMM GRANULOCYTES # BLD AUTO: 0.02 X10(3) UL (ref 0–1)
IMM GRANULOCYTES NFR BLD: 0.3 %
KETONES UR-MCNC: 20 MG/DL
LDLC SERPL CALC-MCNC: 144 MG/DL (ref ?–100)
LEUKOCYTE ESTERASE UR QL STRIP.AUTO: NEGATIVE
LYMPHOCYTES # BLD AUTO: 1.93 X10(3) UL (ref 1–4)
LYMPHOCYTES NFR BLD AUTO: 25 %
M PROTEIN MFR SERPL ELPH: 7.2 G/DL (ref 6.4–8.2)
MCH RBC QN AUTO: 29.4 PG (ref 26–34)
MCHC RBC AUTO-ENTMCNC: 31.7 G/DL (ref 31–37)
MCV RBC AUTO: 92.8 FL
MONOCYTES # BLD AUTO: 0.64 X10(3) UL (ref 0.1–1)
MONOCYTES NFR BLD AUTO: 8.3 %
NEUTROPHILS # BLD AUTO: 4.88 X10 (3) UL (ref 1.5–7.7)
NEUTROPHILS # BLD AUTO: 4.88 X10(3) UL (ref 1.5–7.7)
NEUTROPHILS NFR BLD AUTO: 63.3 %
NITRITE UR QL STRIP.AUTO: NEGATIVE
NONHDLC SERPL-MCNC: 162 MG/DL (ref ?–130)
OSMOLALITY SERPL CALC.SUM OF ELEC: 289 MOSM/KG (ref 275–295)
PATIENT FASTING Y/N/NP: YES
PATIENT FASTING Y/N/NP: YES
PH UR: 7 [PH] (ref 5–8)
PLATELET # BLD AUTO: 282 10(3)UL (ref 150–450)
POTASSIUM SERPL-SCNC: 5.2 MMOL/L (ref 3.5–5.1)
PROT UR-MCNC: NEGATIVE MG/DL
RBC # BLD AUTO: 4.69 X10(6)UL
SODIUM SERPL-SCNC: 141 MMOL/L (ref 136–145)
SP GR UR STRIP: 1.02 (ref 1–1.03)
TRIGL SERPL-MCNC: 91 MG/DL (ref 30–149)
TSI SER-ACNC: 3.14 MIU/ML (ref 0.36–3.74)
UROBILINOGEN UR STRIP-ACNC: <2
VLDLC SERPL CALC-MCNC: 18 MG/DL (ref 0–30)
WBC # BLD AUTO: 7.7 X10(3) UL (ref 4–11)

## 2021-02-22 PROCEDURE — 3008F BODY MASS INDEX DOCD: CPT | Performed by: INTERNAL MEDICINE

## 2021-02-22 PROCEDURE — 36415 COLL VENOUS BLD VENIPUNCTURE: CPT | Performed by: INTERNAL MEDICINE

## 2021-02-22 PROCEDURE — 99395 PREV VISIT EST AGE 18-39: CPT | Performed by: INTERNAL MEDICINE

## 2021-02-22 PROCEDURE — 3079F DIAST BP 80-89 MM HG: CPT | Performed by: INTERNAL MEDICINE

## 2021-02-22 PROCEDURE — 3074F SYST BP LT 130 MM HG: CPT | Performed by: INTERNAL MEDICINE

## 2021-02-22 RX ORDER — MONTELUKAST SODIUM 10 MG/1
10 TABLET ORAL NIGHTLY
Qty: 90 TABLET | Refills: 1 | Status: SHIPPED | OUTPATIENT
Start: 2021-02-22 | End: 2021-05-06

## 2021-02-22 RX ORDER — LEVOCETIRIZINE DIHYDROCHLORIDE 5 MG/1
5 TABLET, FILM COATED ORAL NIGHTLY
Qty: 90 TABLET | Refills: 1 | Status: SHIPPED | OUTPATIENT
Start: 2021-02-22 | End: 2021-11-08

## 2021-02-22 NOTE — PROGRESS NOTES
HPI:    Patient ID: Mariama Castro is a 28year old female.     HPI  Patient comes in for annual physical overall doing okay denies any complaints having some problems with her hemorrhoids feels like she has to push extra hard there is some pain she has been taking: Reported on 7/31/2020 ) 1 Bottle 0     Allergies:No Known Allergies    HISTORY:  Past Medical History:   Diagnosis Date   • CVA (cerebral vascular accident) ([facility] [address] 12/2015   • Dural venous sinus thrombosis 2/23/2016   • Elevated fasting blood sugar exam  (primary encounter diagnosis) exam is okay we will order labs  Environmental and seasonal allergies stable continue current treatment will refill medication  Perennial allergic rhinitis with seasonal variation continue current treatment  Dural venous

## 2021-02-24 ENCOUNTER — OFFICE VISIT (OUTPATIENT)
Dept: INTERNAL MEDICINE CLINIC | Facility: CLINIC | Age: 36
End: 2021-02-24
Payer: COMMERCIAL

## 2021-02-24 VITALS
HEIGHT: 65 IN | DIASTOLIC BLOOD PRESSURE: 76 MMHG | HEART RATE: 82 BPM | SYSTOLIC BLOOD PRESSURE: 111 MMHG | BODY MASS INDEX: 29.49 KG/M2 | WEIGHT: 177 LBS

## 2021-02-24 DIAGNOSIS — Z12.4 SCREENING FOR CERVICAL CANCER: Primary | ICD-10-CM

## 2021-02-24 PROCEDURE — 99213 OFFICE O/P EST LOW 20 MIN: CPT | Performed by: INTERNAL MEDICINE

## 2021-02-24 PROCEDURE — 3008F BODY MASS INDEX DOCD: CPT | Performed by: INTERNAL MEDICINE

## 2021-02-24 PROCEDURE — 3074F SYST BP LT 130 MM HG: CPT | Performed by: INTERNAL MEDICINE

## 2021-02-24 PROCEDURE — 3078F DIAST BP <80 MM HG: CPT | Performed by: INTERNAL MEDICINE

## 2021-02-25 ENCOUNTER — LAB ENCOUNTER (OUTPATIENT)
Dept: LAB | Age: 36
End: 2021-02-25
Attending: INTERNAL MEDICINE
Payer: COMMERCIAL

## 2021-02-25 DIAGNOSIS — E87.5 HYPERKALEMIA: ICD-10-CM

## 2021-02-25 DIAGNOSIS — Z79.01 LONG TERM (CURRENT) USE OF ANTICOAGULANTS: ICD-10-CM

## 2021-02-25 DIAGNOSIS — I63.00 CEREBROVASCULAR ACCIDENT (CVA) DUE TO THROMBOSIS OF PRECEREBRAL ARTERY (HCC): ICD-10-CM

## 2021-02-25 DIAGNOSIS — Z51.81 ENCOUNTER FOR THERAPEUTIC DRUG MONITORING: ICD-10-CM

## 2021-02-25 LAB
INR BLD: 1.43 (ref 0.9–1.2)
POTASSIUM SERPL-SCNC: 4.5 MMOL/L (ref 3.5–5.1)
PROTHROMBIN TIME: 17.2 SECONDS (ref 11.8–14.5)

## 2021-02-25 PROCEDURE — 36415 COLL VENOUS BLD VENIPUNCTURE: CPT

## 2021-02-25 PROCEDURE — 84132 ASSAY OF SERUM POTASSIUM: CPT

## 2021-02-25 PROCEDURE — 85610 PROTHROMBIN TIME: CPT

## 2021-02-25 NOTE — PROGRESS NOTES
HPI:    Patient ID: Migdalia Espinosa is a 28year old female. HPI     She came today for Pap smear. According to her menstrual period is regular she denies any vaginal discharge or pain.     Review of Systems   Constitutional: Negative for activity change, DAILY ON THE REST OF THE WEEK AS DIRECTED BY COUMADIN CLINIC. 45 tablet 0   • WARFARIN SODIUM 1 MG Oral Tab TAKE 1 TABLET BY MOUTH EVERY NIGHT.  SEE INSTRUCTIONS ON INR TABLE WITH COUMADIN CLINIC 90 tablet 0   • WARFARIN SODIUM 1 MG Oral Tab TAKE 1 TABLET B tenderness or bleeding. No erythema, tenderness or bleeding in the vagina. No foreign body in the vagina. No signs of injury in the vagina.               ASSESSMENT/PLAN:   Screening for cervical cancer  (primary encounter diagnosis) pap smear  Do

## 2021-02-26 ENCOUNTER — ANTI-COAG VISIT (OUTPATIENT)
Dept: INTERNAL MEDICINE CLINIC | Facility: CLINIC | Age: 36
End: 2021-02-26

## 2021-02-26 DIAGNOSIS — Z51.81 ENCOUNTER FOR THERAPEUTIC DRUG MONITORING: ICD-10-CM

## 2021-02-26 DIAGNOSIS — D68.59 HYPERCOAGULABLE STATE (HCC): ICD-10-CM

## 2021-02-26 DIAGNOSIS — Z79.01 LONG TERM (CURRENT) USE OF ANTICOAGULANTS: ICD-10-CM

## 2021-02-26 DIAGNOSIS — G08 THROMBOSIS TRANSVERSE SINUS: ICD-10-CM

## 2021-02-26 DIAGNOSIS — I63.00 CEREBROVASCULAR ACCIDENT (CVA) DUE TO THROMBOSIS OF PRECEREBRAL ARTERY (HCC): ICD-10-CM

## 2021-02-26 DIAGNOSIS — G08 DURAL VENOUS SINUS THROMBOSIS: ICD-10-CM

## 2021-02-26 PROCEDURE — 93793 ANTICOAG MGMT PT WARFARIN: CPT | Performed by: INTERNAL MEDICINE

## 2021-03-01 LAB — HPV I/H RISK 1 DNA SPEC QL NAA+PROBE: NEGATIVE

## 2021-03-09 ENCOUNTER — ANTI-COAG VISIT (OUTPATIENT)
Dept: INTERNAL MEDICINE CLINIC | Facility: CLINIC | Age: 36
End: 2021-03-09
Payer: COMMERCIAL

## 2021-03-09 DIAGNOSIS — G08 DURAL VENOUS SINUS THROMBOSIS: ICD-10-CM

## 2021-03-09 DIAGNOSIS — Z79.01 LONG TERM (CURRENT) USE OF ANTICOAGULANTS: ICD-10-CM

## 2021-03-09 DIAGNOSIS — Z51.81 ENCOUNTER FOR THERAPEUTIC DRUG MONITORING: ICD-10-CM

## 2021-03-09 DIAGNOSIS — I63.00 CEREBROVASCULAR ACCIDENT (CVA) DUE TO THROMBOSIS OF PRECEREBRAL ARTERY (HCC): ICD-10-CM

## 2021-03-09 DIAGNOSIS — D68.59 HYPERCOAGULABLE STATE (HCC): ICD-10-CM

## 2021-03-09 DIAGNOSIS — G08 THROMBOSIS TRANSVERSE SINUS: ICD-10-CM

## 2021-03-09 LAB
INR: 3.2 (ref 0.8–1.2)
TEST STRIP EXPIRATION DATE: ABNORMAL DATE

## 2021-03-09 PROCEDURE — 93793 ANTICOAG MGMT PT WARFARIN: CPT

## 2021-03-09 PROCEDURE — 36416 COLLJ CAPILLARY BLOOD SPEC: CPT

## 2021-03-09 PROCEDURE — 85610 PROTHROMBIN TIME: CPT

## 2021-03-29 ENCOUNTER — TELEPHONE (OUTPATIENT)
Dept: HEMATOLOGY/ONCOLOGY | Facility: HOSPITAL | Age: 36
End: 2021-03-29

## 2021-04-06 ENCOUNTER — ANTI-COAG VISIT (OUTPATIENT)
Dept: INTERNAL MEDICINE CLINIC | Facility: CLINIC | Age: 36
End: 2021-04-06
Payer: COMMERCIAL

## 2021-04-06 DIAGNOSIS — G08 DURAL VENOUS SINUS THROMBOSIS: ICD-10-CM

## 2021-04-06 DIAGNOSIS — G08 THROMBOSIS TRANSVERSE SINUS: ICD-10-CM

## 2021-04-06 DIAGNOSIS — D68.59 HYPERCOAGULABLE STATE (HCC): ICD-10-CM

## 2021-04-06 DIAGNOSIS — I63.00 CEREBROVASCULAR ACCIDENT (CVA) DUE TO THROMBOSIS OF PRECEREBRAL ARTERY (HCC): ICD-10-CM

## 2021-04-06 DIAGNOSIS — Z79.01 LONG TERM (CURRENT) USE OF ANTICOAGULANTS: ICD-10-CM

## 2021-04-06 DIAGNOSIS — Z51.81 ENCOUNTER FOR THERAPEUTIC DRUG MONITORING: ICD-10-CM

## 2021-04-06 PROCEDURE — 36416 COLLJ CAPILLARY BLOOD SPEC: CPT

## 2021-04-06 PROCEDURE — 93793 ANTICOAG MGMT PT WARFARIN: CPT

## 2021-04-06 PROCEDURE — 85610 PROTHROMBIN TIME: CPT

## 2021-05-04 ENCOUNTER — ANTI-COAG VISIT (OUTPATIENT)
Dept: INTERNAL MEDICINE CLINIC | Facility: CLINIC | Age: 36
End: 2021-05-04
Payer: COMMERCIAL

## 2021-05-04 DIAGNOSIS — I63.00 CEREBROVASCULAR ACCIDENT (CVA) DUE TO THROMBOSIS OF PRECEREBRAL ARTERY (HCC): ICD-10-CM

## 2021-05-04 DIAGNOSIS — D68.59 HYPERCOAGULABLE STATE (HCC): ICD-10-CM

## 2021-05-04 DIAGNOSIS — Z51.81 ENCOUNTER FOR THERAPEUTIC DRUG MONITORING: ICD-10-CM

## 2021-05-04 DIAGNOSIS — Z79.01 LONG TERM (CURRENT) USE OF ANTICOAGULANTS: ICD-10-CM

## 2021-05-04 DIAGNOSIS — G08 DURAL VENOUS SINUS THROMBOSIS: ICD-10-CM

## 2021-05-04 DIAGNOSIS — G08 THROMBOSIS TRANSVERSE SINUS: ICD-10-CM

## 2021-05-04 PROCEDURE — 36416 COLLJ CAPILLARY BLOOD SPEC: CPT

## 2021-05-04 PROCEDURE — 85610 PROTHROMBIN TIME: CPT

## 2021-05-04 PROCEDURE — 93793 ANTICOAG MGMT PT WARFARIN: CPT

## 2021-05-05 DIAGNOSIS — J30.89 PERENNIAL ALLERGIC RHINITIS WITH SEASONAL VARIATION: ICD-10-CM

## 2021-05-05 DIAGNOSIS — J30.2 PERENNIAL ALLERGIC RHINITIS WITH SEASONAL VARIATION: ICD-10-CM

## 2021-05-05 DIAGNOSIS — J30.89 ENVIRONMENTAL AND SEASONAL ALLERGIES: ICD-10-CM

## 2021-05-06 RX ORDER — MONTELUKAST SODIUM 10 MG/1
10 TABLET ORAL NIGHTLY
Qty: 30 TABLET | Refills: 0 | Status: SHIPPED | OUTPATIENT
Start: 2021-05-06 | End: 2021-10-18

## 2021-05-06 NOTE — TELEPHONE ENCOUNTER
Patient last seen in Allergy 5/6/2020 (Telemedicine) for  . . . Perennial allergic rhinitis with seasonal variation  (primary encounter diagnosis)  Environmental and seasonal allergies    Refill request received for .  . .    Montelukast Sodium 10 MG Ora

## 2021-05-06 NOTE — TELEPHONE ENCOUNTER
Message left on patient's voicemail informing her that one month refill of Singulair was sent to her pharmacy, informed she will need to be seen in Allergy for any further refills.      Patient asked to call the Allergy Department to schedule a f/u appt or

## 2021-05-18 RX ORDER — WARFARIN SODIUM 5 MG/1
TABLET ORAL
Qty: 45 TABLET | Refills: 0 | Status: SHIPPED | OUTPATIENT
Start: 2021-05-18 | End: 2021-06-14

## 2021-05-18 RX ORDER — WARFARIN SODIUM 1 MG/1
1 TABLET ORAL NIGHTLY
Qty: 90 TABLET | Refills: 0 | Status: SHIPPED | OUTPATIENT
Start: 2021-05-18 | End: 2021-08-02

## 2021-05-24 ENCOUNTER — ANTI-COAG VISIT (OUTPATIENT)
Dept: INTERNAL MEDICINE CLINIC | Facility: CLINIC | Age: 36
End: 2021-05-24
Payer: COMMERCIAL

## 2021-05-24 DIAGNOSIS — Z51.81 ENCOUNTER FOR THERAPEUTIC DRUG MONITORING: ICD-10-CM

## 2021-05-24 DIAGNOSIS — Z79.01 LONG TERM (CURRENT) USE OF ANTICOAGULANTS: ICD-10-CM

## 2021-05-24 DIAGNOSIS — D68.59 HYPERCOAGULABLE STATE (HCC): ICD-10-CM

## 2021-05-24 DIAGNOSIS — G08 THROMBOSIS TRANSVERSE SINUS: ICD-10-CM

## 2021-05-24 DIAGNOSIS — G08 DURAL VENOUS SINUS THROMBOSIS: ICD-10-CM

## 2021-05-24 DIAGNOSIS — I63.00 CEREBROVASCULAR ACCIDENT (CVA) DUE TO THROMBOSIS OF PRECEREBRAL ARTERY (HCC): ICD-10-CM

## 2021-05-24 PROCEDURE — 85610 PROTHROMBIN TIME: CPT

## 2021-05-24 PROCEDURE — 93793 ANTICOAG MGMT PT WARFARIN: CPT

## 2021-06-14 RX ORDER — WARFARIN SODIUM 5 MG/1
TABLET ORAL
Qty: 45 TABLET | Refills: 0 | Status: SHIPPED | OUTPATIENT
Start: 2021-06-14 | End: 2021-07-07

## 2021-06-22 ENCOUNTER — ANTI-COAG VISIT (OUTPATIENT)
Dept: INTERNAL MEDICINE CLINIC | Facility: CLINIC | Age: 36
End: 2021-06-22
Payer: COMMERCIAL

## 2021-06-22 DIAGNOSIS — G08 DURAL VENOUS SINUS THROMBOSIS: ICD-10-CM

## 2021-06-22 DIAGNOSIS — D68.59 HYPERCOAGULABLE STATE (HCC): ICD-10-CM

## 2021-06-22 DIAGNOSIS — I63.00 CEREBROVASCULAR ACCIDENT (CVA) DUE TO THROMBOSIS OF PRECEREBRAL ARTERY (HCC): ICD-10-CM

## 2021-06-22 DIAGNOSIS — G08 THROMBOSIS TRANSVERSE SINUS: ICD-10-CM

## 2021-06-22 DIAGNOSIS — Z79.01 LONG TERM (CURRENT) USE OF ANTICOAGULANTS: ICD-10-CM

## 2021-06-22 DIAGNOSIS — Z51.81 ENCOUNTER FOR THERAPEUTIC DRUG MONITORING: ICD-10-CM

## 2021-06-22 PROCEDURE — 93793 ANTICOAG MGMT PT WARFARIN: CPT

## 2021-06-22 PROCEDURE — 85610 PROTHROMBIN TIME: CPT

## 2021-07-07 RX ORDER — WARFARIN SODIUM 5 MG/1
TABLET ORAL
Qty: 45 TABLET | Refills: 0 | Status: SHIPPED | OUTPATIENT
Start: 2021-07-07 | End: 2021-08-13

## 2021-08-02 RX ORDER — WARFARIN SODIUM 1 MG/1
TABLET ORAL
Qty: 90 TABLET | Refills: 0 | Status: SHIPPED | OUTPATIENT
Start: 2021-08-02 | End: 2021-12-03

## 2021-08-04 DIAGNOSIS — J30.89 ENVIRONMENTAL AND SEASONAL ALLERGIES: ICD-10-CM

## 2021-08-04 DIAGNOSIS — J30.2 PERENNIAL ALLERGIC RHINITIS WITH SEASONAL VARIATION: ICD-10-CM

## 2021-08-04 DIAGNOSIS — J30.89 PERENNIAL ALLERGIC RHINITIS WITH SEASONAL VARIATION: ICD-10-CM

## 2021-08-04 RX ORDER — MONTELUKAST SODIUM 10 MG/1
10 TABLET ORAL NIGHTLY
Qty: 30 TABLET | Refills: 0 | OUTPATIENT
Start: 2021-08-04

## 2021-08-04 NOTE — TELEPHONE ENCOUNTER
Refill request denied. Patient last seen in May 2020. Refill was granted in May 2021 and advised to follow-up within the month.

## 2021-08-04 NOTE — TELEPHONE ENCOUNTER
Patient last seen in Allergy 5/6/2020 (Telemedicine Visit) for . . . Perennial allergic rhinitis with seasonal variation  (primary encounter diagnosis)  Environmental and seasonal allergies    Refill request received for .  . .    Montelukast Sodium 10 M

## 2021-08-13 DIAGNOSIS — J30.89 ENVIRONMENTAL AND SEASONAL ALLERGIES: ICD-10-CM

## 2021-08-13 DIAGNOSIS — J30.89 PERENNIAL ALLERGIC RHINITIS WITH SEASONAL VARIATION: ICD-10-CM

## 2021-08-13 DIAGNOSIS — J30.2 PERENNIAL ALLERGIC RHINITIS WITH SEASONAL VARIATION: ICD-10-CM

## 2021-08-14 RX ORDER — WARFARIN SODIUM 5 MG/1
TABLET ORAL
Qty: 45 TABLET | Refills: 0 | Status: SHIPPED | OUTPATIENT
Start: 2021-08-14 | End: 2021-10-12

## 2021-08-16 RX ORDER — MONTELUKAST SODIUM 10 MG/1
10 TABLET ORAL NIGHTLY
Qty: 30 TABLET | Refills: 0 | OUTPATIENT
Start: 2021-08-16

## 2021-08-18 NOTE — PATIENT INSTRUCTIONS
ASSESSMENT/PLAN:   Cerebrovascular accident (cva) due to thrombosis of precerebral artery (hcc)  (primary encounter diagnosis)-will give referral to neuro  Dural venous sinus thrombosis-  Bmi 34.0-34.9,adult-need to lose weight will refer to dietitian  Jorge 57

## 2021-08-20 NOTE — TELEPHONE ENCOUNTER
Spoke, with the patient and informed her of the message below. Pt did make an appt to see Dr. Radha Motta on 8-24-21.

## 2021-08-24 ENCOUNTER — OFFICE VISIT (OUTPATIENT)
Dept: INTERNAL MEDICINE CLINIC | Facility: CLINIC | Age: 36
End: 2021-08-24
Payer: COMMERCIAL

## 2021-08-24 ENCOUNTER — ANTI-COAG VISIT (OUTPATIENT)
Dept: INTERNAL MEDICINE CLINIC | Facility: CLINIC | Age: 36
End: 2021-08-24
Payer: COMMERCIAL

## 2021-08-24 VITALS
HEART RATE: 70 BPM | SYSTOLIC BLOOD PRESSURE: 122 MMHG | DIASTOLIC BLOOD PRESSURE: 74 MMHG | OXYGEN SATURATION: 99 % | BODY MASS INDEX: 27.99 KG/M2 | HEIGHT: 65 IN | RESPIRATION RATE: 17 BRPM | TEMPERATURE: 98 F | WEIGHT: 168 LBS

## 2021-08-24 DIAGNOSIS — Z79.01 LONG TERM (CURRENT) USE OF ANTICOAGULANTS: ICD-10-CM

## 2021-08-24 DIAGNOSIS — I63.00 CEREBROVASCULAR ACCIDENT (CVA) DUE TO THROMBOSIS OF PRECEREBRAL ARTERY (HCC): ICD-10-CM

## 2021-08-24 DIAGNOSIS — D68.59 HYPERCOAGULABLE STATE (HCC): ICD-10-CM

## 2021-08-24 DIAGNOSIS — R10.13 EPIGASTRIC PAIN: ICD-10-CM

## 2021-08-24 DIAGNOSIS — Z86.73 HISTORY OF CVA IN ADULTHOOD: ICD-10-CM

## 2021-08-24 DIAGNOSIS — Z51.81 ENCOUNTER FOR THERAPEUTIC DRUG MONITORING: ICD-10-CM

## 2021-08-24 DIAGNOSIS — E78.5 HYPERLIPIDEMIA, UNSPECIFIED HYPERLIPIDEMIA TYPE: Primary | ICD-10-CM

## 2021-08-24 DIAGNOSIS — G08 THROMBOSIS TRANSVERSE SINUS: ICD-10-CM

## 2021-08-24 DIAGNOSIS — G08 DURAL VENOUS SINUS THROMBOSIS: ICD-10-CM

## 2021-08-24 LAB
CHOLEST SMN-MCNC: 181 MG/DL (ref ?–200)
HDLC SERPL-MCNC: 45 MG/DL (ref 40–59)
INR: 3.1 (ref 0.8–1.2)
LDLC SERPL CALC-MCNC: 117 MG/DL (ref ?–100)
NONHDLC SERPL-MCNC: 136 MG/DL (ref ?–130)
PATIENT FASTING Y/N/NP: YES
TEST STRIP EXPIRATION DATE: ABNORMAL DATE
TRIGL SERPL-MCNC: 103 MG/DL (ref 30–149)
VLDLC SERPL CALC-MCNC: 18 MG/DL (ref 0–30)

## 2021-08-24 PROCEDURE — 3008F BODY MASS INDEX DOCD: CPT | Performed by: INTERNAL MEDICINE

## 2021-08-24 PROCEDURE — 36415 COLL VENOUS BLD VENIPUNCTURE: CPT | Performed by: INTERNAL MEDICINE

## 2021-08-24 PROCEDURE — 3074F SYST BP LT 130 MM HG: CPT | Performed by: INTERNAL MEDICINE

## 2021-08-24 PROCEDURE — 85610 PROTHROMBIN TIME: CPT

## 2021-08-24 PROCEDURE — 93793 ANTICOAG MGMT PT WARFARIN: CPT

## 2021-08-24 PROCEDURE — 3078F DIAST BP <80 MM HG: CPT | Performed by: INTERNAL MEDICINE

## 2021-08-24 PROCEDURE — 99214 OFFICE O/P EST MOD 30 MIN: CPT | Performed by: INTERNAL MEDICINE

## 2021-08-24 RX ORDER — PANTOPRAZOLE SODIUM 40 MG/1
40 TABLET, DELAYED RELEASE ORAL
Qty: 30 TABLET | Refills: 0 | Status: SHIPPED | OUTPATIENT
Start: 2021-08-24

## 2021-08-24 NOTE — PROGRESS NOTES
HPI/Subjective:     Patient ID: Jose Martin Nuñez is a 39year old female. HPI  Patient comes for 6-month follow-up overall doing okay except for some abdominal pain epigastric thinks food makes it worse going on for few months now.  Denies any blood in stoo Relation Age of Onset   • Diabetes Father    • Other (ischemic bowel) Father    • Stroke Mother         Unsure of age at time of stroke.     • Other (Htn) Paternal Grandmother    • Other (DVT) Paternal [de-identified]         unsure of age, also on her LEs.      • Oth Hyperlipidemia, unspecified hyperlipidemia type  (primary encounter diagnosis) we will retest watch diet  Long term (current) use of anticoagulants stable with INR clinic  History of CVA in adulthood stable no residual effect  Hypercoagulable state ([facility] [address]

## 2021-09-13 ENCOUNTER — NURSE TRIAGE (OUTPATIENT)
Dept: INTERNAL MEDICINE CLINIC | Facility: CLINIC | Age: 36
End: 2021-09-13

## 2021-09-13 NOTE — TELEPHONE ENCOUNTER
Action Requested: Summary for Provider     []  Critical Lab, Recommendations Needed  [] Need Additional Advice  []   FYI    []   Need Orders  [] Need Medications Sent to Pharmacy  []  Other     SUMMARY:     \"Rolled\" her foot last night while walking.   No

## 2021-10-04 ENCOUNTER — TELEPHONE (OUTPATIENT)
Dept: INTERNAL MEDICINE CLINIC | Facility: CLINIC | Age: 36
End: 2021-10-04

## 2021-10-04 DIAGNOSIS — Z79.01 LONG TERM (CURRENT) USE OF ANTICOAGULANTS: ICD-10-CM

## 2021-10-04 DIAGNOSIS — Z51.81 ENCOUNTER FOR THERAPEUTIC DRUG MONITORING: ICD-10-CM

## 2021-10-04 DIAGNOSIS — I63.00 CEREBROVASCULAR ACCIDENT (CVA) DUE TO THROMBOSIS OF PRECEREBRAL ARTERY (HCC): Primary | ICD-10-CM

## 2021-10-04 NOTE — TELEPHONE ENCOUNTER
Anticoag referral expires soon. Order pended. Please sign, thank you.       Dx: Cerebrovascular accident (CVA) due to thrombosis of precerebral artery ([facility] [address] (I63.00)  Encounter for therapeutic drug monitoring (Z51.81)  Long term (current) use of anticoagula

## 2021-10-05 ENCOUNTER — TELEPHONE (OUTPATIENT)
Dept: INTERNAL MEDICINE CLINIC | Facility: CLINIC | Age: 36
End: 2021-10-05

## 2021-10-05 ENCOUNTER — ANTI-COAG VISIT (OUTPATIENT)
Dept: INTERNAL MEDICINE CLINIC | Facility: CLINIC | Age: 36
End: 2021-10-05
Payer: COMMERCIAL

## 2021-10-05 DIAGNOSIS — I63.00 CEREBROVASCULAR ACCIDENT (CVA) DUE TO THROMBOSIS OF PRECEREBRAL ARTERY (HCC): ICD-10-CM

## 2021-10-05 DIAGNOSIS — Z79.01 LONG TERM (CURRENT) USE OF ANTICOAGULANTS: ICD-10-CM

## 2021-10-05 DIAGNOSIS — D68.59 HYPERCOAGULABLE STATE (HCC): ICD-10-CM

## 2021-10-05 DIAGNOSIS — G08 THROMBOSIS TRANSVERSE SINUS: ICD-10-CM

## 2021-10-05 DIAGNOSIS — Z51.81 ENCOUNTER FOR THERAPEUTIC DRUG MONITORING: ICD-10-CM

## 2021-10-05 DIAGNOSIS — G08 DURAL VENOUS SINUS THROMBOSIS: ICD-10-CM

## 2021-10-05 PROCEDURE — 93793 ANTICOAG MGMT PT WARFARIN: CPT

## 2021-10-05 PROCEDURE — 85610 PROTHROMBIN TIME: CPT

## 2021-10-05 NOTE — TELEPHONE ENCOUNTER
Today's INR= 1.1  Goal= 2.0-3.0    Reports that she went out of town for the weekend and forgot her pills- missed 3 doses. Denies any other change in diet/meds.      Per protocol, patient to take one extra dose of warfarin or increase weekly dose 10-20% and

## 2021-10-12 DIAGNOSIS — J30.89 ENVIRONMENTAL AND SEASONAL ALLERGIES: ICD-10-CM

## 2021-10-12 DIAGNOSIS — J30.2 PERENNIAL ALLERGIC RHINITIS WITH SEASONAL VARIATION: ICD-10-CM

## 2021-10-12 DIAGNOSIS — J30.89 PERENNIAL ALLERGIC RHINITIS WITH SEASONAL VARIATION: ICD-10-CM

## 2021-10-12 RX ORDER — MONTELUKAST SODIUM 10 MG/1
10 TABLET ORAL NIGHTLY
Qty: 30 TABLET | Refills: 0 | OUTPATIENT
Start: 2021-10-12

## 2021-10-12 NOTE — TELEPHONE ENCOUNTER
Refill request tinnitus patient was last seen in May 2020.   Patient will need follow-up appointment for refills

## 2021-10-13 RX ORDER — WARFARIN SODIUM 5 MG/1
TABLET ORAL
Qty: 45 TABLET | Refills: 0 | Status: SHIPPED | OUTPATIENT
Start: 2021-10-13 | End: 2021-12-03

## 2021-10-18 ENCOUNTER — PATIENT MESSAGE (OUTPATIENT)
Dept: ALLERGY | Facility: CLINIC | Age: 36
End: 2021-10-18

## 2021-10-18 DIAGNOSIS — J30.2 PERENNIAL ALLERGIC RHINITIS WITH SEASONAL VARIATION: ICD-10-CM

## 2021-10-18 DIAGNOSIS — J30.89 PERENNIAL ALLERGIC RHINITIS WITH SEASONAL VARIATION: ICD-10-CM

## 2021-10-18 DIAGNOSIS — J30.89 ENVIRONMENTAL AND SEASONAL ALLERGIES: ICD-10-CM

## 2021-10-18 RX ORDER — MONTELUKAST SODIUM 10 MG/1
10 TABLET ORAL NIGHTLY
Qty: 15 TABLET | Refills: 0 | Status: SHIPPED | OUTPATIENT
Start: 2021-10-18 | End: 2021-11-05

## 2021-10-18 RX ORDER — MONTELUKAST SODIUM 10 MG/1
10 TABLET ORAL NIGHTLY
Qty: 30 TABLET | Refills: 0 | OUTPATIENT
Start: 2021-10-18

## 2021-10-18 NOTE — TELEPHONE ENCOUNTER
From: Brenda Saunders  To: Rebeka Stanton MD  Sent: 10/18/2021 2:36 PM CDT  Subject: Other    Can you approve atleast 10 pills of the Montelukast?

## 2021-10-18 NOTE — TELEPHONE ENCOUNTER
montelukast 10 MG Oral Tab          Possible duplicate: Karen to review recent actions on this medication    Sig: Take 1 tablet (10 mg total) by mouth nightly.     Disp:  30 tablet    Refills:  0    Start: 10/18/2021    Class: Normal    Non-formulary For:

## 2021-10-18 NOTE — TELEPHONE ENCOUNTER
Refill request denied.   Will address refills at her follow-up appointment as patient was last seen in spring 2020

## 2021-10-20 RX ORDER — WARFARIN SODIUM 1 MG/1
TABLET ORAL
Qty: 30 TABLET | Refills: 0 | OUTPATIENT
Start: 2021-10-20

## 2021-10-20 RX ORDER — PANTOPRAZOLE SODIUM 40 MG/1
TABLET, DELAYED RELEASE ORAL
Qty: 30 TABLET | Refills: 0 | OUTPATIENT
Start: 2021-10-20

## 2021-10-26 NOTE — TELEPHONE ENCOUNTER
LOV 5/6/20. Pt canceled appointment for 10/28/21. Rescheduled 11/10/21. Pt requesting additional refill of montelukast (SINGULAIR) 10 MG Oral Tab until upcoming appointment. Please advise, ty.

## 2021-10-27 RX ORDER — MONTELUKAST SODIUM 10 MG/1
10 TABLET ORAL NIGHTLY
Qty: 15 TABLET | Refills: 0 | OUTPATIENT
Start: 2021-10-27

## 2021-10-27 NOTE — TELEPHONE ENCOUNTER
Refill request denied.   Will address refills at her follow-up appointment on November 10 this patient was last seen over 1 year ago in May 2020

## 2021-11-05 ENCOUNTER — TELEMEDICINE (OUTPATIENT)
Dept: INTERNAL MEDICINE CLINIC | Facility: CLINIC | Age: 36
End: 2021-11-05

## 2021-11-05 DIAGNOSIS — R09.81 SINUS CONGESTION: Primary | ICD-10-CM

## 2021-11-05 DIAGNOSIS — J30.2 SEASONAL ALLERGIES: ICD-10-CM

## 2021-11-05 PROCEDURE — 99214 OFFICE O/P EST MOD 30 MIN: CPT | Performed by: INTERNAL MEDICINE

## 2021-11-05 RX ORDER — MONTELUKAST SODIUM 10 MG/1
10 TABLET ORAL NIGHTLY
Qty: 30 TABLET | Refills: 2 | Status: SHIPPED | OUTPATIENT
Start: 2021-11-05 | End: 2021-12-03

## 2021-11-05 NOTE — PROGRESS NOTES
Subjective:   Patient ID: Josh Cooper is a 39year old female.     HPI  Patient calls today with complaint of her sinuses being more congested due to her allergies are acting up she is run out of her montelukast which she usually takes an thinks that this time.         Assessment & Plan:   Sinus congestion  (primary encounter diagnosis) we will refill the montelukast start taking Flonase daily continue with an antihistamine let us know if not better  Seasonal allergies as above any worsening symptoms let us

## 2021-11-08 RX ORDER — LEVOCETIRIZINE DIHYDROCHLORIDE 5 MG/1
5 TABLET, FILM COATED ORAL NIGHTLY
Qty: 90 TABLET | Refills: 1 | Status: SHIPPED | OUTPATIENT
Start: 2021-11-08 | End: 2022-05-25

## 2021-11-08 NOTE — TELEPHONE ENCOUNTER
Refill passed per [address] [place] protocol.   Requested Prescriptions   Pending Prescriptions Disp Refills    LEVOCETIRIZINE 5 MG Oral Tab [Pharmacy Med Name: LEVOCETIRIZINE 5MG TABLETS] 90 tablet 1     Sig: TAKE 1 TABLET(5 MG) BY MOUTH EVERY NIGHT        Allergy Medication Protocol Passed - 11/8/2021  5:54 AM        Passed - Appoinment in past 12 or next 3 months               Recent Outpatient Visits              3 days ago Sinus congestion    [address] [place], [address], [place]    Telemedicine    2 months ago Hyperlipidemia, unspecified hyperlipidemia type    [address] [facility] [place] [facility], [address], [place]    Office Visit    8 months ago Screening for cervical cancer    [address] [facility] [place] [facility], [address], Julieta Madsen MD    Office Visit    8 months ago Annual physical exam    rAisteo Mcgill MD    Office Visit    1 year ago Hypercoagulable state [facility])    [facility] Hematology Oncology Danish Morales MD    Office Visit            Future Appointments         Provider Department Appt Notes    In 2 days Cassandra Shannon MD [address] [facility] Cassia Romero

## 2021-11-30 ENCOUNTER — TELEMEDICINE (OUTPATIENT)
Dept: ALLERGY | Facility: CLINIC | Age: 36
End: 2021-11-30

## 2021-11-30 DIAGNOSIS — Z23 FLU VACCINE NEED: ICD-10-CM

## 2021-11-30 DIAGNOSIS — J30.89 PERENNIAL ALLERGIC RHINITIS WITH SEASONAL VARIATION: Primary | ICD-10-CM

## 2021-11-30 DIAGNOSIS — J30.89 ENVIRONMENTAL AND SEASONAL ALLERGIES: ICD-10-CM

## 2021-11-30 DIAGNOSIS — J30.2 PERENNIAL ALLERGIC RHINITIS WITH SEASONAL VARIATION: Primary | ICD-10-CM

## 2021-11-30 DIAGNOSIS — Z92.29 COVID-19 VACCINE SERIES COMPLETED: ICD-10-CM

## 2021-11-30 PROCEDURE — 99213 OFFICE O/P EST LOW 20 MIN: CPT | Performed by: ALLERGY & IMMUNOLOGY

## 2021-11-30 RX ORDER — PREDNISONE 20 MG/1
TABLET ORAL
Qty: 10 TABLET | Refills: 0 | Status: SHIPPED | OUTPATIENT
Start: 2021-11-30

## 2021-11-30 NOTE — PROGRESS NOTES
Rylee Esparza is a 39year old female. HPI:   No chief complaint on file.     Patient is a 51-year-old female who presents for follow-up via video visit    This visit is conducted using Telemedicine with live, interactive video and audio during this [employer] APPENDECTOMY  approx.  2006   • APPENDECTOMY     • LIPOMA REMOVAL        Family History   Problem Relation Age of Onset   • Diabetes Father    • Other (ischemic bowel) Father    • Stroke Mother         Charissa Alexander of age at time of stroke.     • Other (Htn) Onofre Miller heartbeat/palpitations, chest pain, edema  Constitutional:  Negative night sweats,weight loss, irritability and lethargy  ENMT:  Negative for ear drainage, hearing loss see hpi   Eyes:  Negative for eye discharge and vision loss  Gastrointestinal:  Negativ review of potential adverse side effects as well as potential efficacy. Patient's questions were answered in regards to medication administration and dosing and potential side effects.  Teaching was provided via the teach back method

## 2021-12-03 RX ORDER — WARFARIN SODIUM 1 MG/1
1 TABLET ORAL NIGHTLY
Qty: 90 TABLET | Refills: 0 | Status: SHIPPED | OUTPATIENT
Start: 2021-12-03

## 2021-12-03 RX ORDER — WARFARIN SODIUM 5 MG/1
TABLET ORAL
Qty: 45 TABLET | Refills: 0 | Status: SHIPPED | OUTPATIENT
Start: 2021-12-03

## 2021-12-03 RX ORDER — MONTELUKAST SODIUM 10 MG/1
10 TABLET ORAL NIGHTLY
Qty: 30 TABLET | Refills: 2 | Status: SHIPPED | OUTPATIENT
Start: 2021-12-03

## 2021-12-07 ENCOUNTER — TELEPHONE (OUTPATIENT)
Dept: INTERNAL MEDICINE CLINIC | Facility: CLINIC | Age: 36
End: 2021-12-07

## 2021-12-08 ENCOUNTER — TELEPHONE (OUTPATIENT)
Dept: INTERNAL MEDICINE CLINIC | Facility: CLINIC | Age: 36
End: 2021-12-08

## 2021-12-08 NOTE — TELEPHONE ENCOUNTER
Patient is calling to request an appointment. Patient is being transferred to the [facility] Site.

## 2021-12-14 ENCOUNTER — ANTI-COAG VISIT (OUTPATIENT)
Dept: INTERNAL MEDICINE CLINIC | Facility: CLINIC | Age: 36
End: 2021-12-14
Payer: COMMERCIAL

## 2021-12-14 DIAGNOSIS — I63.00 CEREBROVASCULAR ACCIDENT (CVA) DUE TO THROMBOSIS OF PRECEREBRAL ARTERY (HCC): ICD-10-CM

## 2021-12-14 DIAGNOSIS — D68.59 HYPERCOAGULABLE STATE (HCC): ICD-10-CM

## 2021-12-14 DIAGNOSIS — G08 DURAL VENOUS SINUS THROMBOSIS: ICD-10-CM

## 2021-12-14 DIAGNOSIS — Z79.01 LONG TERM (CURRENT) USE OF ANTICOAGULANTS: ICD-10-CM

## 2021-12-14 DIAGNOSIS — Z51.81 ENCOUNTER FOR THERAPEUTIC DRUG MONITORING: ICD-10-CM

## 2021-12-14 DIAGNOSIS — G08 THROMBOSIS TRANSVERSE SINUS: ICD-10-CM

## 2021-12-14 PROCEDURE — 93793 ANTICOAG MGMT PT WARFARIN: CPT

## 2021-12-14 PROCEDURE — 85610 PROTHROMBIN TIME: CPT

## 2022-01-11 ENCOUNTER — ANTI-COAG VISIT (OUTPATIENT)
Dept: INTERNAL MEDICINE CLINIC | Facility: CLINIC | Age: 37
End: 2022-01-11
Payer: COMMERCIAL

## 2022-01-11 DIAGNOSIS — D68.59 HYPERCOAGULABLE STATE (HCC): ICD-10-CM

## 2022-01-11 DIAGNOSIS — G08 DURAL VENOUS SINUS THROMBOSIS: ICD-10-CM

## 2022-01-11 DIAGNOSIS — G08 THROMBOSIS TRANSVERSE SINUS: ICD-10-CM

## 2022-01-11 DIAGNOSIS — Z79.01 LONG TERM (CURRENT) USE OF ANTICOAGULANTS: ICD-10-CM

## 2022-01-11 DIAGNOSIS — I63.00 CEREBROVASCULAR ACCIDENT (CVA) DUE TO THROMBOSIS OF PRECEREBRAL ARTERY (HCC): ICD-10-CM

## 2022-01-11 DIAGNOSIS — Z51.81 ENCOUNTER FOR THERAPEUTIC DRUG MONITORING: ICD-10-CM

## 2022-01-11 LAB — INR: 1.9 (ref 0.8–1.2)

## 2022-01-11 PROCEDURE — 93793 ANTICOAG MGMT PT WARFARIN: CPT

## 2022-01-11 PROCEDURE — 85610 PROTHROMBIN TIME: CPT

## 2022-02-08 ENCOUNTER — ANTI-COAG VISIT (OUTPATIENT)
Dept: INTERNAL MEDICINE CLINIC | Facility: CLINIC | Age: 37
End: 2022-02-08
Payer: COMMERCIAL

## 2022-02-08 DIAGNOSIS — G08 DURAL VENOUS SINUS THROMBOSIS: ICD-10-CM

## 2022-02-08 DIAGNOSIS — Z51.81 ENCOUNTER FOR THERAPEUTIC DRUG MONITORING: ICD-10-CM

## 2022-02-08 DIAGNOSIS — D68.59 HYPERCOAGULABLE STATE (HCC): ICD-10-CM

## 2022-02-08 DIAGNOSIS — G08 THROMBOSIS TRANSVERSE SINUS: ICD-10-CM

## 2022-02-08 DIAGNOSIS — Z79.01 LONG TERM (CURRENT) USE OF ANTICOAGULANTS: ICD-10-CM

## 2022-02-08 DIAGNOSIS — I63.00 CEREBROVASCULAR ACCIDENT (CVA) DUE TO THROMBOSIS OF PRECEREBRAL ARTERY (HCC): ICD-10-CM

## 2022-02-08 LAB — INR: 1.7 (ref 0.8–1.2)

## 2022-02-08 PROCEDURE — 85610 PROTHROMBIN TIME: CPT

## 2022-02-08 PROCEDURE — 93793 ANTICOAG MGMT PT WARFARIN: CPT

## 2022-02-23 RX ORDER — WARFARIN SODIUM 1 MG/1
1 TABLET ORAL NIGHTLY
Qty: 90 TABLET | Refills: 0 | Status: SHIPPED | OUTPATIENT
Start: 2022-02-23 | End: 2022-03-14

## 2022-02-23 RX ORDER — WARFARIN SODIUM 5 MG/1
TABLET ORAL
Qty: 45 TABLET | Refills: 0 | Status: SHIPPED | OUTPATIENT
Start: 2022-02-23 | End: 2022-03-14

## 2022-02-23 RX ORDER — MONTELUKAST SODIUM 10 MG/1
10 TABLET ORAL NIGHTLY
Qty: 30 TABLET | Refills: 2 | Status: SHIPPED | OUTPATIENT
Start: 2022-02-23

## 2022-03-14 RX ORDER — WARFARIN SODIUM 5 MG/1
TABLET ORAL
Qty: 45 TABLET | Refills: 0 | Status: SHIPPED | OUTPATIENT
Start: 2022-03-14

## 2022-03-14 RX ORDER — WARFARIN SODIUM 1 MG/1
1 TABLET ORAL NIGHTLY
Qty: 90 TABLET | Refills: 0 | Status: SHIPPED | OUTPATIENT
Start: 2022-03-14 | End: 2022-03-16

## 2022-03-16 ENCOUNTER — TELEPHONE (OUTPATIENT)
Dept: INTERNAL MEDICINE CLINIC | Facility: CLINIC | Age: 37
End: 2022-03-16

## 2022-03-16 ENCOUNTER — OFFICE VISIT (OUTPATIENT)
Dept: INTERNAL MEDICINE CLINIC | Facility: CLINIC | Age: 37
End: 2022-03-16
Payer: COMMERCIAL

## 2022-03-16 ENCOUNTER — ANTI-COAG VISIT (OUTPATIENT)
Dept: INTERNAL MEDICINE CLINIC | Facility: CLINIC | Age: 37
End: 2022-03-16
Payer: COMMERCIAL

## 2022-03-16 VITALS
SYSTOLIC BLOOD PRESSURE: 109 MMHG | OXYGEN SATURATION: 95 % | BODY MASS INDEX: 25.83 KG/M2 | HEIGHT: 65 IN | DIASTOLIC BLOOD PRESSURE: 73 MMHG | TEMPERATURE: 99 F | WEIGHT: 155 LBS | HEART RATE: 76 BPM

## 2022-03-16 DIAGNOSIS — G08 DURAL VENOUS SINUS THROMBOSIS: ICD-10-CM

## 2022-03-16 DIAGNOSIS — Z79.01 LONG TERM (CURRENT) USE OF ANTICOAGULANTS: ICD-10-CM

## 2022-03-16 DIAGNOSIS — I63.00 CEREBROVASCULAR ACCIDENT (CVA) DUE TO THROMBOSIS OF PRECEREBRAL ARTERY (HCC): ICD-10-CM

## 2022-03-16 DIAGNOSIS — D68.59 HYPERCOAGULABLE STATE (HCC): ICD-10-CM

## 2022-03-16 DIAGNOSIS — Z11.3 SCREENING FOR STD (SEXUALLY TRANSMITTED DISEASE): ICD-10-CM

## 2022-03-16 DIAGNOSIS — E53.8 B12 DEFICIENCY: ICD-10-CM

## 2022-03-16 DIAGNOSIS — Z00.00 ANNUAL PHYSICAL EXAM: Primary | ICD-10-CM

## 2022-03-16 DIAGNOSIS — Z51.81 ENCOUNTER FOR THERAPEUTIC DRUG MONITORING: ICD-10-CM

## 2022-03-16 DIAGNOSIS — G08 THROMBOSIS TRANSVERSE SINUS: ICD-10-CM

## 2022-03-16 LAB
ALBUMIN SERPL-MCNC: 3.6 G/DL (ref 3.4–5)
ALBUMIN/GLOB SERPL: 1.2 {RATIO} (ref 1–2)
ALP LIVER SERPL-CCNC: 63 U/L
ALT SERPL-CCNC: 20 U/L
ANION GAP SERPL CALC-SCNC: 5 MMOL/L (ref 0–18)
AST SERPL-CCNC: 13 U/L (ref 15–37)
BASOPHILS # BLD AUTO: 0.06 X10(3) UL (ref 0–0.2)
BASOPHILS NFR BLD AUTO: 1.1 %
BILIRUB SERPL-MCNC: 0.4 MG/DL (ref 0.1–2)
BUN/CREAT SERPL: 6.8 (ref 10–20)
CALCIUM BLD-MCNC: 8.7 MG/DL (ref 8.5–10.1)
CHLORIDE SERPL-SCNC: 112 MMOL/L (ref 98–112)
CHOLEST SERPL-MCNC: 168 MG/DL (ref ?–200)
CO2 SERPL-SCNC: 26 MMOL/L (ref 21–32)
CREAT BLD-MCNC: 0.59 MG/DL
DEPRECATED RDW RBC AUTO: 53.8 FL (ref 35.1–46.3)
EOSINOPHIL # BLD AUTO: 0.16 X10(3) UL (ref 0–0.7)
EOSINOPHIL NFR BLD AUTO: 3 %
ERYTHROCYTE [DISTWIDTH] IN BLOOD BY AUTOMATED COUNT: 15.2 % (ref 11–15)
FASTING PATIENT LIPID ANSWER: YES
FASTING STATUS PATIENT QL REPORTED: YES
GLOBULIN PLAS-MCNC: 3 G/DL (ref 2.8–4.4)
GLUCOSE BLD-MCNC: 73 MG/DL (ref 70–99)
HBV SURFACE AG SER-ACNC: <0.1 [IU]/L
HBV SURFACE AG SERPL QL IA: NONREACTIVE
HCT VFR BLD AUTO: 43.5 %
HCV AB SERPL QL IA: NONREACTIVE
HDLC SERPL-MCNC: 48 MG/DL (ref 40–59)
HGB BLD-MCNC: 12.8 G/DL
IMM GRANULOCYTES # BLD AUTO: 0 X10(3) UL (ref 0–1)
IMM GRANULOCYTES NFR BLD: 0 %
INR: 2.4 (ref 0.8–1.2)
LDLC SERPL CALC-MCNC: 105 MG/DL (ref ?–100)
LYMPHOCYTES # BLD AUTO: 1.22 X10(3) UL (ref 1–4)
LYMPHOCYTES NFR BLD AUTO: 22.6 %
MCH RBC QN AUTO: 27.9 PG (ref 26–34)
MCHC RBC AUTO-ENTMCNC: 29.4 G/DL (ref 31–37)
MCV RBC AUTO: 95 FL
MONOCYTES # BLD AUTO: 0.43 X10(3) UL (ref 0.1–1)
MONOCYTES NFR BLD AUTO: 7.9 %
NEUTROPHILS # BLD AUTO: 3.54 X10 (3) UL (ref 1.5–7.7)
NEUTROPHILS # BLD AUTO: 3.54 X10(3) UL (ref 1.5–7.7)
NONHDLC SERPL-MCNC: 120 MG/DL (ref ?–130)
OSMOLALITY SERPL CALC.SUM OF ELEC: 291 MOSM/KG (ref 275–295)
PLATELET # BLD AUTO: 288 10(3)UL (ref 150–450)
PROT SERPL-MCNC: 6.6 G/DL (ref 6.4–8.2)
RBC # BLD AUTO: 4.58 X10(6)UL
SODIUM SERPL-SCNC: 143 MMOL/L (ref 136–145)
TEST STRIP EXPIRATION DATE: ABNORMAL DATE
TRIGL SERPL-MCNC: 81 MG/DL (ref 30–149)
TSI SER-ACNC: 2.16 MIU/ML (ref 0.36–3.74)
VIT B12 SERPL-MCNC: 263 PG/ML (ref 193–986)
VLDLC SERPL CALC-MCNC: 14 MG/DL (ref 0–30)
WBC # BLD AUTO: 5.4 X10(3) UL (ref 4–11)

## 2022-03-16 PROCEDURE — 99395 PREV VISIT EST AGE 18-39: CPT | Performed by: INTERNAL MEDICINE

## 2022-03-16 PROCEDURE — 3078F DIAST BP <80 MM HG: CPT | Performed by: INTERNAL MEDICINE

## 2022-03-16 PROCEDURE — 3074F SYST BP LT 130 MM HG: CPT | Performed by: INTERNAL MEDICINE

## 2022-03-16 PROCEDURE — 85610 PROTHROMBIN TIME: CPT

## 2022-03-16 PROCEDURE — 93793 ANTICOAG MGMT PT WARFARIN: CPT

## 2022-03-16 PROCEDURE — 3008F BODY MASS INDEX DOCD: CPT | Performed by: INTERNAL MEDICINE

## 2022-03-16 PROCEDURE — 36415 COLL VENOUS BLD VENIPUNCTURE: CPT | Performed by: INTERNAL MEDICINE

## 2022-03-17 LAB
C TRACH DNA SPEC QL NAA+PROBE: NEGATIVE
N GONORRHOEA DNA SPEC QL NAA+PROBE: NEGATIVE

## 2022-03-18 LAB — T PALLIDUM AB SER QL: NEGATIVE

## 2022-04-08 ENCOUNTER — OFFICE VISIT (OUTPATIENT)
Dept: INTERNAL MEDICINE CLINIC | Facility: CLINIC | Age: 37
End: 2022-04-08
Payer: COMMERCIAL

## 2022-04-08 VITALS
WEIGHT: 155 LBS | HEART RATE: 67 BPM | OXYGEN SATURATION: 99 % | BODY MASS INDEX: 25.83 KG/M2 | RESPIRATION RATE: 16 BRPM | DIASTOLIC BLOOD PRESSURE: 72 MMHG | HEIGHT: 65 IN | SYSTOLIC BLOOD PRESSURE: 110 MMHG | TEMPERATURE: 98 F

## 2022-04-08 DIAGNOSIS — F81.9 LEARNING DISORDER: ICD-10-CM

## 2022-04-08 DIAGNOSIS — R41.840 CONCENTRATION DEFICIT: Primary | ICD-10-CM

## 2022-04-08 PROCEDURE — 3074F SYST BP LT 130 MM HG: CPT | Performed by: INTERNAL MEDICINE

## 2022-04-08 PROCEDURE — 3008F BODY MASS INDEX DOCD: CPT | Performed by: INTERNAL MEDICINE

## 2022-04-08 PROCEDURE — 99213 OFFICE O/P EST LOW 20 MIN: CPT | Performed by: INTERNAL MEDICINE

## 2022-04-08 PROCEDURE — 3078F DIAST BP <80 MM HG: CPT | Performed by: INTERNAL MEDICINE

## 2022-04-13 RX ORDER — WARFARIN SODIUM 1 MG/1
1 TABLET ORAL NIGHTLY
Qty: 90 TABLET | Refills: 0 | Status: SHIPPED | OUTPATIENT
Start: 2022-04-13

## 2022-04-13 RX ORDER — MONTELUKAST SODIUM 10 MG/1
10 TABLET ORAL NIGHTLY
Qty: 30 TABLET | Refills: 2 | Status: SHIPPED | OUTPATIENT
Start: 2022-04-13

## 2022-04-13 RX ORDER — WARFARIN SODIUM 5 MG/1
TABLET ORAL
Qty: 45 TABLET | Refills: 0 | Status: SHIPPED | OUTPATIENT
Start: 2022-04-13

## 2022-04-18 ENCOUNTER — TELEPHONE (OUTPATIENT)
Dept: INTERNAL MEDICINE CLINIC | Facility: CLINIC | Age: 37
End: 2022-04-18

## 2022-04-18 NOTE — TELEPHONE ENCOUNTER
MD Jaden Faulkner, [address] [place] [address]; Levi Ryan  Can we let pt know to call [facility]

## 2022-04-18 NOTE — TELEPHONE ENCOUNTER
Spoke to pt to f/u if someone has reeach out to her from [employer]? Per pt, they have and she has an appt for sometime in June. Pt asked I send her a MyChart msg with their phone number, she may want to call and r/s for a sooner appt. Sent msg to pt.      Just ISACC Javier

## 2022-05-02 ENCOUNTER — ANTI-COAG VISIT (OUTPATIENT)
Dept: INTERNAL MEDICINE CLINIC | Facility: CLINIC | Age: 37
End: 2022-05-02
Payer: COMMERCIAL

## 2022-05-02 DIAGNOSIS — G08 DURAL VENOUS SINUS THROMBOSIS: ICD-10-CM

## 2022-05-02 DIAGNOSIS — Z79.01 LONG TERM (CURRENT) USE OF ANTICOAGULANTS: ICD-10-CM

## 2022-05-02 DIAGNOSIS — G08 THROMBOSIS TRANSVERSE SINUS: ICD-10-CM

## 2022-05-02 DIAGNOSIS — D68.59 HYPERCOAGULABLE STATE (HCC): ICD-10-CM

## 2022-05-02 DIAGNOSIS — I63.00 CEREBROVASCULAR ACCIDENT (CVA) DUE TO THROMBOSIS OF PRECEREBRAL ARTERY (HCC): ICD-10-CM

## 2022-05-02 DIAGNOSIS — Z51.81 ENCOUNTER FOR THERAPEUTIC DRUG MONITORING: ICD-10-CM

## 2022-05-02 LAB
INR: 1.3 (ref 0.8–1.2)
TEST STRIP EXPIRATION DATE: ABNORMAL DATE

## 2022-05-02 PROCEDURE — 85610 PROTHROMBIN TIME: CPT

## 2022-05-02 PROCEDURE — 93793 ANTICOAG MGMT PT WARFARIN: CPT

## 2022-05-24 ENCOUNTER — ANTI-COAG VISIT (OUTPATIENT)
Dept: ANTICOAGULATION | Facility: CLINIC | Age: 37
End: 2022-05-24
Payer: COMMERCIAL

## 2022-05-24 DIAGNOSIS — G08 DURAL VENOUS SINUS THROMBOSIS: ICD-10-CM

## 2022-05-24 DIAGNOSIS — Z51.81 ENCOUNTER FOR THERAPEUTIC DRUG MONITORING: ICD-10-CM

## 2022-05-24 DIAGNOSIS — G08 THROMBOSIS TRANSVERSE SINUS: ICD-10-CM

## 2022-05-24 DIAGNOSIS — Z79.01 LONG TERM (CURRENT) USE OF ANTICOAGULANTS: ICD-10-CM

## 2022-05-24 DIAGNOSIS — I63.00 CEREBROVASCULAR ACCIDENT (CVA) DUE TO THROMBOSIS OF PRECEREBRAL ARTERY (HCC): ICD-10-CM

## 2022-05-24 DIAGNOSIS — D68.59 HYPERCOAGULABLE STATE (HCC): ICD-10-CM

## 2022-05-24 LAB
INR: 1.1 (ref 0.8–1.2)
TEST STRIP EXPIRATION DATE: NORMAL DATE

## 2022-05-24 PROCEDURE — 85610 PROTHROMBIN TIME: CPT | Performed by: INTERNAL MEDICINE

## 2022-05-24 PROCEDURE — 93793 ANTICOAG MGMT PT WARFARIN: CPT | Performed by: INTERNAL MEDICINE

## 2022-05-26 RX ORDER — LEVOCETIRIZINE DIHYDROCHLORIDE 5 MG/1
5 TABLET, FILM COATED ORAL NIGHTLY
Qty: 90 TABLET | Refills: 1 | Status: SHIPPED | OUTPATIENT
Start: 2022-05-26

## 2022-05-26 RX ORDER — WARFARIN SODIUM 5 MG/1
TABLET ORAL
Qty: 45 TABLET | Refills: 0 | Status: SHIPPED | OUTPATIENT
Start: 2022-05-26

## 2022-05-26 RX ORDER — WARFARIN SODIUM 1 MG/1
1 TABLET ORAL NIGHTLY
Qty: 90 TABLET | Refills: 0 | Status: SHIPPED | OUTPATIENT
Start: 2022-05-26

## 2022-05-26 RX ORDER — MONTELUKAST SODIUM 10 MG/1
10 TABLET ORAL NIGHTLY
Qty: 30 TABLET | Refills: 2 | Status: SHIPPED | OUTPATIENT
Start: 2022-05-26

## 2022-05-27 ENCOUNTER — OFFICE VISIT (OUTPATIENT)
Dept: OCCUPATIONAL MEDICINE | Age: 37
End: 2022-05-27
Attending: FAMILY MEDICINE

## 2022-05-27 ENCOUNTER — TELEPHONE (OUTPATIENT)
Dept: INTERNAL MEDICINE CLINIC | Facility: CLINIC | Age: 37
End: 2022-05-27

## 2022-05-27 DIAGNOSIS — Z00.00 ROUTINE GENERAL MEDICAL EXAMINATION AT A HEALTH CARE FACILITY: Primary | ICD-10-CM

## 2022-05-27 PROCEDURE — 86480 TB TEST CELL IMMUN MEASURE: CPT

## 2022-05-27 NOTE — TELEPHONE ENCOUNTER
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 Please advise. If creating letter, please send via [address]

## 2022-05-27 NOTE — TELEPHONE ENCOUNTER
Patient requesting a release form for her employer to let them know she is not able to work while taking [address]. Patient is working as an EMT, new job. Patient requesting release form faxed to [phone] or via e-mail [email or web address] as soon as possible.

## 2022-05-31 LAB
M TB IFN-G CD4+ T-CELLS BLD-ACNC: 0 IU/ML
M TB TUBERC IFN-G BLD QL: NEGATIVE
M TB TUBERC IGNF/MITOGEN IGNF CONTROL: >10 IU/ML
QFT TB1 AG MINUS NIL: 0.02 IU/ML
QFT TB2 AG MINUS NIL: 0 IU/ML

## 2022-06-13 ENCOUNTER — TELEPHONE (OUTPATIENT)
Dept: ANTICOAGULATION | Facility: CLINIC | Age: 37
End: 2022-06-13

## 2022-06-13 NOTE — TELEPHONE ENCOUNTER
Attempted to reach patient by phone, no vm available. Kaldoorat msg sent. Patient overdue for INR as of 6/7/22, appt was cancelled.

## 2022-06-23 ENCOUNTER — ANTI-COAG VISIT (OUTPATIENT)
Dept: ANTICOAGULATION | Facility: CLINIC | Age: 37
End: 2022-06-23
Payer: COMMERCIAL

## 2022-06-23 DIAGNOSIS — G08 DURAL VENOUS SINUS THROMBOSIS: ICD-10-CM

## 2022-06-23 DIAGNOSIS — I63.00 CEREBROVASCULAR ACCIDENT (CVA) DUE TO THROMBOSIS OF PRECEREBRAL ARTERY (HCC): ICD-10-CM

## 2022-06-23 DIAGNOSIS — D68.59 HYPERCOAGULABLE STATE (HCC): ICD-10-CM

## 2022-06-23 DIAGNOSIS — G08 THROMBOSIS TRANSVERSE SINUS: ICD-10-CM

## 2022-06-23 DIAGNOSIS — Z51.81 ENCOUNTER FOR THERAPEUTIC DRUG MONITORING: ICD-10-CM

## 2022-06-23 DIAGNOSIS — Z79.01 LONG TERM (CURRENT) USE OF ANTICOAGULANTS: ICD-10-CM

## 2022-06-23 LAB
INR: 1.7 (ref 0.8–1.2)
TEST STRIP EXPIRATION DATE: ABNORMAL DATE

## 2022-06-23 PROCEDURE — 93793 ANTICOAG MGMT PT WARFARIN: CPT | Performed by: INTERNAL MEDICINE

## 2022-06-23 PROCEDURE — 85610 PROTHROMBIN TIME: CPT | Performed by: INTERNAL MEDICINE

## 2022-06-24 RX ORDER — WARFARIN SODIUM 1 MG/1
1 TABLET ORAL NIGHTLY
Qty: 90 TABLET | Refills: 0 | Status: SHIPPED | OUTPATIENT
Start: 2022-06-24

## 2022-06-24 RX ORDER — WARFARIN SODIUM 5 MG/1
TABLET ORAL
Qty: 45 TABLET | Refills: 0 | Status: SHIPPED | OUTPATIENT
Start: 2022-06-24

## 2022-06-24 RX ORDER — MONTELUKAST SODIUM 10 MG/1
10 TABLET ORAL NIGHTLY
Qty: 30 TABLET | Refills: 2 | Status: SHIPPED | OUTPATIENT
Start: 2022-06-24

## 2022-07-13 ENCOUNTER — ANTI-COAG VISIT (OUTPATIENT)
Dept: ANTICOAGULATION | Facility: CLINIC | Age: 37
End: 2022-07-13
Payer: COMMERCIAL

## 2022-07-13 DIAGNOSIS — Z51.81 ENCOUNTER FOR THERAPEUTIC DRUG MONITORING: ICD-10-CM

## 2022-07-13 DIAGNOSIS — I63.00 CEREBROVASCULAR ACCIDENT (CVA) DUE TO THROMBOSIS OF PRECEREBRAL ARTERY (HCC): ICD-10-CM

## 2022-07-13 DIAGNOSIS — Z79.01 LONG TERM (CURRENT) USE OF ANTICOAGULANTS: ICD-10-CM

## 2022-07-13 DIAGNOSIS — G08 THROMBOSIS TRANSVERSE SINUS: ICD-10-CM

## 2022-07-13 DIAGNOSIS — G08 DURAL VENOUS SINUS THROMBOSIS: ICD-10-CM

## 2022-07-13 DIAGNOSIS — D68.59 HYPERCOAGULABLE STATE (HCC): ICD-10-CM

## 2022-07-13 LAB
INR: 2.5 (ref 0.8–1.2)
TEST STRIP EXPIRATION DATE: ABNORMAL DATE

## 2022-07-13 PROCEDURE — 93793 ANTICOAG MGMT PT WARFARIN: CPT | Performed by: INTERNAL MEDICINE

## 2022-07-13 PROCEDURE — 85610 PROTHROMBIN TIME: CPT | Performed by: INTERNAL MEDICINE

## 2022-08-09 ENCOUNTER — HOSPITAL ENCOUNTER (OUTPATIENT)
Dept: GENERAL RADIOLOGY | Age: 37
Discharge: HOME | End: 2022-08-09
Attending: FAMILY MEDICINE

## 2022-08-09 ENCOUNTER — OFFICE VISIT (OUTPATIENT)
Dept: OCCUPATIONAL MEDICINE | Age: 37
End: 2022-08-09
Attending: FAMILY MEDICINE

## 2022-08-09 DIAGNOSIS — S97.82XA CRUSH INJURY OF LEFT FOOT, INITIAL ENCOUNTER: Primary | ICD-10-CM

## 2022-08-09 DIAGNOSIS — S97.82XA CRUSH INJURY OF LEFT FOOT, INITIAL ENCOUNTER: ICD-10-CM

## 2022-08-09 PROCEDURE — 73630 X-RAY EXAM OF FOOT: CPT | Performed by: FAMILY MEDICINE

## 2022-08-23 ENCOUNTER — ANTI-COAG VISIT (OUTPATIENT)
Dept: ANTICOAGULATION | Facility: CLINIC | Age: 37
End: 2022-08-23
Payer: COMMERCIAL

## 2022-08-23 DIAGNOSIS — Z79.01 LONG TERM (CURRENT) USE OF ANTICOAGULANTS: ICD-10-CM

## 2022-08-23 DIAGNOSIS — I63.00 CEREBROVASCULAR ACCIDENT (CVA) DUE TO THROMBOSIS OF PRECEREBRAL ARTERY (HCC): ICD-10-CM

## 2022-08-23 DIAGNOSIS — Z51.81 ENCOUNTER FOR THERAPEUTIC DRUG MONITORING: ICD-10-CM

## 2022-08-23 DIAGNOSIS — D68.59 HYPERCOAGULABLE STATE (HCC): ICD-10-CM

## 2022-08-23 DIAGNOSIS — G08 THROMBOSIS TRANSVERSE SINUS: ICD-10-CM

## 2022-08-23 DIAGNOSIS — G08 DURAL VENOUS SINUS THROMBOSIS: ICD-10-CM

## 2022-08-23 LAB
INR: 2.7 (ref 0.8–1.2)
TEST STRIP EXPIRATION DATE: ABNORMAL DATE

## 2022-08-23 PROCEDURE — 93793 ANTICOAG MGMT PT WARFARIN: CPT | Performed by: FAMILY MEDICINE

## 2022-08-23 PROCEDURE — 85610 PROTHROMBIN TIME: CPT | Performed by: FAMILY MEDICINE

## 2022-08-25 RX ORDER — WARFARIN SODIUM 5 MG/1
TABLET ORAL
Qty: 45 TABLET | Refills: 0 | Status: SHIPPED | OUTPATIENT
Start: 2022-08-25

## 2022-08-25 RX ORDER — MONTELUKAST SODIUM 10 MG/1
10 TABLET ORAL NIGHTLY
Qty: 30 TABLET | Refills: 2 | Status: SHIPPED | OUTPATIENT
Start: 2022-08-25

## 2022-09-21 RX ORDER — MONTELUKAST SODIUM 10 MG/1
10 TABLET ORAL NIGHTLY
Qty: 30 TABLET | Refills: 2 | Status: SHIPPED | OUTPATIENT
Start: 2022-09-21

## 2022-09-21 RX ORDER — WARFARIN SODIUM 5 MG/1
TABLET ORAL
Qty: 45 TABLET | Refills: 0 | Status: SHIPPED | OUTPATIENT
Start: 2022-09-21

## 2022-09-21 RX ORDER — WARFARIN SODIUM 1 MG/1
1 TABLET ORAL NIGHTLY
Qty: 90 TABLET | Refills: 0 | Status: SHIPPED | OUTPATIENT
Start: 2022-09-21

## 2022-09-30 ENCOUNTER — TELEPHONE (OUTPATIENT)
Dept: ANTICOAGULATION | Facility: CLINIC | Age: 37
End: 2022-09-30

## 2022-11-03 RX ORDER — WARFARIN SODIUM 5 MG/1
TABLET ORAL
Qty: 45 TABLET | Refills: 0 | Status: SHIPPED | OUTPATIENT
Start: 2022-11-03

## 2022-11-03 RX ORDER — MONTELUKAST SODIUM 10 MG/1
10 TABLET ORAL NIGHTLY
Qty: 30 TABLET | Refills: 2 | Status: SHIPPED | OUTPATIENT
Start: 2022-11-03

## 2022-11-03 RX ORDER — WARFARIN SODIUM 1 MG/1
1 TABLET ORAL NIGHTLY
Qty: 90 TABLET | Refills: 0 | Status: SHIPPED | OUTPATIENT
Start: 2022-11-03

## 2022-11-03 RX ORDER — LEVOCETIRIZINE DIHYDROCHLORIDE 5 MG/1
5 TABLET, FILM COATED ORAL NIGHTLY
Qty: 90 TABLET | Refills: 1 | Status: SHIPPED | OUTPATIENT
Start: 2022-11-03

## 2022-12-01 ENCOUNTER — TELEPHONE (OUTPATIENT)
Dept: ANTICOAGULATION | Facility: CLINIC | Age: 37
End: 2022-12-01

## 2022-12-01 DIAGNOSIS — Z51.81 MONITORING FOR LONG-TERM ANTICOAGULANT USE: ICD-10-CM

## 2022-12-01 DIAGNOSIS — Z79.01 MONITORING FOR LONG-TERM ANTICOAGULANT USE: ICD-10-CM

## 2022-12-01 DIAGNOSIS — I63.00 CEREBROVASCULAR ACCIDENT (CVA) DUE TO THROMBOSIS OF PRECEREBRAL ARTERY (HCC): Primary | ICD-10-CM

## 2022-12-22 RX ORDER — WARFARIN SODIUM 5 MG/1
TABLET ORAL
Qty: 45 TABLET | Refills: 0 | Status: SHIPPED | OUTPATIENT
Start: 2022-12-22

## 2022-12-22 RX ORDER — MONTELUKAST SODIUM 10 MG/1
10 TABLET ORAL NIGHTLY
Qty: 30 TABLET | Refills: 2 | Status: SHIPPED | OUTPATIENT
Start: 2022-12-22

## 2022-12-22 RX ORDER — LEVOCETIRIZINE DIHYDROCHLORIDE 5 MG/1
5 TABLET, FILM COATED ORAL NIGHTLY
Qty: 90 TABLET | Refills: 1 | Status: SHIPPED | OUTPATIENT
Start: 2022-12-22

## 2022-12-22 RX ORDER — WARFARIN SODIUM 1 MG/1
1 TABLET ORAL NIGHTLY
Qty: 90 TABLET | Refills: 0 | Status: SHIPPED | OUTPATIENT
Start: 2022-12-22

## 2022-12-22 NOTE — TELEPHONE ENCOUNTER
Refill passed per [facility], [facility] protocol. Requested Prescriptions   Pending Prescriptions Disp Refills    levocetirizine 5 MG Oral Tab 90 tablet 1     Sig: Take 1 tablet (5 mg total) by mouth nightly. Allergy Medication Protocol Passed - 12/21/2022 10:11 PM        Passed - In person appointment or virtual visit in the past 12 mos or appointment in next 3 mos     Recent Outpatient Visits              4 months ago Crush injury of left foot, initial encounter    [address] Visit    6 months ago Routine general medical examination at a health care facility    [address] [place] [address]    8 months ago Concentration deficit    [facility], [facility], [place], Gillian Carranza MD    Office Visit    9 months ago Annual physical exam    Enrique Arevalo MD    Office Visit    10 months ago Depression with anxiety    Enrique Arevalo MD    Office Visit                        warfarin 1 MG Oral Tab 90 tablet 0     Sig: Take 1 tablet (1 mg total) by mouth nightly.        Rx Em Warfarin Protocol Failed - 12/21/2022 10:11 PM        Failed - INR 3.9 or less past 6 weeks     INR   Date Value Ref Range Status   08/23/2022 2.7 (A) 0.8 - 1.2 Final                   Passed - Appointment in past 12 or next 3 months     Recent Outpatient Visits              4 months ago Crush injury of left foot, initial encounter    [address] [facility]    6 months ago Routine general medical examination at a health care facility    [address] [place] [address]    8 months ago Concentration deficit    Enrique Arevalo MD    Office Visit    9 months ago Annual physical exam    Enrique Arevalo MD    Office Visit    10 months ago Depression with anxiety    [place] Jennifer Reyes Agron B, MD    Office Visit                      [address] within past 12 months     No results found for this or any previous visit (from the past 4380 hour(s)). Passed - AST < 111 (less than 3 Xs the upper limit)     Lab Results   Component Value Date    AST 13 (L) 03/16/2022                     Passed - ALT < 168 (less than 3Xs the upper limit)     Lab Results   Component Value Date    ALT 20 03/16/2022                     Passed - CBC within the past 12 months     Recent Results (from the past 8760 hour(s))   CBC W/ DIFFERENTIAL    Collection Time: 03/16/22 12:09 PM   Result Value Ref Range    WBC 5.4 4.0 - 11.0 x10(3) uL    RBC 4.58 3.80 - 5.30 x10(6)uL    HGB 12.8 12.0 - 16.0 g/dL    HCT 43.5 35.0 - 48.0 %    MCV 95.0 80.0 - 100.0 fL    MCH 27.9 26.0 - 34.0 pg    MCHC 29.4 (L) 31.0 - 37.0 g/dL    RDW-SD 53.8 (H) 35.1 - 46.3 fL    RDW 15.2 (H) 11.0 - 15.0 %    .0 150.0 - 450.0 10(3)uL    Neutrophil Absolute Prelim 3.54 1.50 - 7.70 x10 (3) uL    Neutrophil Absolute 3.54 1.50 - 7.70 x10(3) uL    Lymphocyte Absolute 1.22 1.00 - 4.00 x10(3) uL    Monocyte Absolute 0.43 0.10 - 1.00 x10(3) uL    Eosinophil Absolute 0.16 0.00 - 0.70 x10(3) uL    Basophil Absolute 0.06 0.00 - 0.20 x10(3) uL    Immature Granulocyte Absolute 0.00 0.00 - 1.00 x10(3) uL    Neutrophil % 65.4 %    Lymphocyte % 22.6 %    Monocyte % 7.9 %    Eosinophil % 3.0 %    Basophil % 1.1 %    Immature Granulocyte % 0.0 %     *Note: Due to a large number of results and/or encounters for the requested time period, some results have not been displayed. A complete set of results can be found in Results Review.                  Passed - Hgb >/= 10g/dl within past 12 months     HGB   Date Value Ref Range Status   03/16/2022 12.8 12.0 - 16.0 g/dL Final                   Passed - Platelets >/= 609 past 12 months     PLT   Date Value Ref Range Status   03/16/2022 288.0 150.0 - 450.0 10(3)uL Final warfarin 5 MG Oral Tab 45 tablet 0     Sig: TAKE 1 AND 1/2 TABLETS BY MOUTH ON MONDAY, 1 TABLET DAILY ON THE REST OF THE WEEK AS DIRECTED BY COUMADIN CLINIC. Rx Em Warfarin Protocol Failed - 12/21/2022 10:11 PM        Failed - INR 3.9 or less past 6 weeks     INR   Date Value Ref Range Status   08/23/2022 2.7 (A) 0.8 - 1.2 Final                   Passed - Appointment in past 12 or next 3 months     Recent Outpatient Visits              4 months ago Crush injury of left foot, initial encounter    [address]    6 months ago Routine general medical examination at a health care facility    [address]    8 months ago Concentration deficit    Gumaro Galindo MD    Office Visit    9 months ago Annual physical exam    Gumaro Galindo MD    Office Visit    10 months ago Depression with anxiety    Gumaro Galindo MD    Office Visit                      Passed - CMP within past 12 months     No results found for this or any previous visit (from the past 4380 hour(s)).                 Passed - AST < 111 (less than 3 Xs the upper limit)     Lab Results   Component Value Date    AST 13 (L) 03/16/2022                     Passed - ALT < 168 (less than 3Xs the upper limit)     Lab Results   Component Value Date    ALT 20 03/16/2022                     Passed - CBC within the past 12 months     Recent Results (from the past 8760 hour(s))   CBC W/ DIFFERENTIAL    Collection Time: 03/16/22 12:09 PM   Result Value Ref Range    WBC 5.4 4.0 - 11.0 x10(3) uL    RBC 4.58 3.80 - 5.30 x10(6)uL    HGB 12.8 12.0 - 16.0 g/dL    HCT 43.5 35.0 - 48.0 %    MCV 95.0 80.0 - 100.0 fL    MCH 27.9 26.0 - 34.0 pg    MCHC 29.4 (L) 31.0 - 37.0 g/dL    RDW-SD 53.8 (H) 35.1 - 46.3 fL    RDW 15.2 (H) 11.0 - 15.0 %    .0 150.0 - 450.0 10(3)uL    Neutrophil Absolute Prelim 3.54 1.50 - 7.70 x10 (3) uL    Neutrophil Absolute 3.54 1.50 - 7.70 x10(3) uL    Lymphocyte Absolute 1.22 1.00 - 4.00 x10(3) uL    Monocyte Absolute 0.43 0.10 - 1.00 x10(3) uL    Eosinophil Absolute 0.16 0.00 - 0.70 x10(3) uL    Basophil Absolute 0.06 0.00 - 0.20 x10(3) uL    Immature Granulocyte Absolute 0.00 0.00 - 1.00 x10(3) uL    Neutrophil % 65.4 %    Lymphocyte % 22.6 %    Monocyte % 7.9 %    Eosinophil % 3.0 %    Basophil % 1.1 %    Immature Granulocyte % 0.0 %     *Note: Due to a large number of results and/or encounters for the requested time period, some results have not been displayed. A complete set of results can be found in Results Review. Passed - Hgb >/= 10g/dl within past 12 months     HGB   Date Value Ref Range Status   03/16/2022 12.8 12.0 - 16.0 g/dL Final                   Passed - Platelets >/= 467 past 12 months     PLT   Date Value Ref Range Status   03/16/2022 288.0 150.0 - 450.0 10(3)uL Final                     montelukast (SINGULAIR) 10 MG Oral Tab 30 tablet 2     Sig: Take 1 tablet (10 mg total) by mouth nightly.        Asthma & COPD Medication Protocol Failed - 12/21/2022 10:11 PM        Failed - In person appointment or virtual visit in the past 6 mos or appointment in next 3 mos     Recent Outpatient Visits              4 months ago Crush injury of left foot, initial encounter    [address] Visit    6 months ago Routine general medical examination at a health care facility    [address] [facility]    8 months ago Concentration deficit    [address] [place], [address], Yulissa Talavera MD    Office Visit    9 months ago Annual physical exam    Charisse Seo MD    Office Visit    10 months ago Depression with anxiety    [facility], Yulissa Talavera MD    Office Visit Recent Outpatient Visits              4 months ago Crush injury of left foot, initial encounter    [address] [facility]    6 months ago Routine general medical examination at a health care facility    [address] [facility] [address]    8 months ago Concentration deficit    [address] [place], [place], Aristides Rodriguez MD    Office Visit    9 months ago Annual physical exam    Ramón Skinner MD    Office Visit    10 months ago Depression with anxiety    Luana Tang, Aristides Rodriguez MD    Office Visit

## 2022-12-22 NOTE — TELEPHONE ENCOUNTER
Please review. Protocol failed. Requested Prescriptions   Pending Prescriptions Disp Refills    warfarin 1 MG Oral Tab 90 tablet 0     Sig: Take 1 tablet (1 mg total) by mouth nightly. Rx Em Warfarin Protocol Failed - 12/21/2022 10:11 PM        Failed - INR 3.9 or less past 6 weeks     INR   Date Value Ref Range Status   08/23/2022 2.7 (A) 0.8 - 1.2 Final                   Passed - Appointment in past 12 or next 3 months     Recent Outpatient Visits              4 months ago Crush injury of left foot, initial encounter    [address]    6 months ago Routine general medical examination at a health care facility    [address] [facility] [address]    8 months ago Concentration deficit    Petr Burr MD    Office Visit    9 months ago Annual physical exam    Petr Burr MD    Office Visit    10 months ago Depression with anxiety    Petr Burr MD    Office Visit                      Passed - CMP within past 12 months     No results found for this or any previous visit (from the past 4380 hour(s)).                 Passed - AST < 111 (less than 3 Xs the upper limit)     Lab Results   Component Value Date    AST 13 (L) 03/16/2022                     Passed - ALT < 168 (less than 3Xs the upper limit)     Lab Results   Component Value Date    ALT 20 03/16/2022                     Passed - CBC within the past 12 months     Recent Results (from the past 8760 hour(s))   CBC W/ DIFFERENTIAL    Collection Time: 03/16/22 12:09 PM   Result Value Ref Range    WBC 5.4 4.0 - 11.0 x10(3) uL    RBC 4.58 3.80 - 5.30 x10(6)uL    HGB 12.8 12.0 - 16.0 g/dL    HCT 43.5 35.0 - 48.0 %    MCV 95.0 80.0 - 100.0 fL    MCH 27.9 26.0 - 34.0 pg    MCHC 29.4 (L) 31.0 - 37.0 g/dL    RDW-SD 53.8 (H) 35.1 - 46.3 fL    RDW 15.2 (H) 11.0 - 15.0 %    PLT 288.0 150.0 - 450.0 10(3)uL    Neutrophil Absolute Prelim 3.54 1.50 - 7.70 x10 (3) uL    Neutrophil Absolute 3.54 1.50 - 7.70 x10(3) uL    Lymphocyte Absolute 1.22 1.00 - 4.00 x10(3) uL    Monocyte Absolute 0.43 0.10 - 1.00 x10(3) uL    Eosinophil Absolute 0.16 0.00 - 0.70 x10(3) uL    Basophil Absolute 0.06 0.00 - 0.20 x10(3) uL    Immature Granulocyte Absolute 0.00 0.00 - 1.00 x10(3) uL    Neutrophil % 65.4 %    Lymphocyte % 22.6 %    Monocyte % 7.9 %    Eosinophil % 3.0 %    Basophil % 1.1 %    Immature Granulocyte % 0.0 %     *Note: Due to a large number of results and/or encounters for the requested time period, some results have not been displayed. A complete set of results can be found in Results Review. Passed - Hgb >/= 10g/dl within past 12 months     HGB   Date Value Ref Range Status   03/16/2022 12.8 12.0 - 16.0 g/dL Final                   Passed - Platelets >/= 323 past 12 months     PLT   Date Value Ref Range Status   03/16/2022 288.0 150.0 - 450.0 10(3)uL Final                     warfarin 5 MG Oral Tab 45 tablet 0     Sig: TAKE 1 AND 1/2 TABLETS BY MOUTH ON MONDAY, 1 TABLET DAILY ON THE REST OF THE WEEK AS DIRECTED BY COUMADIN CLINIC.        Rx Em Warfarin Protocol Failed - 12/21/2022 10:11 PM        Failed - INR 3.9 or less past 6 weeks     INR   Date Value Ref Range Status   08/23/2022 2.7 (A) 0.8 - 1.2 Final                   Passed - Appointment in past 12 or next 3 months     Recent Outpatient Visits              4 months ago Crush injury of left foot, initial encounter    [address] [place] [address] Street    6 months ago Routine general medical examination at a health care facility    [address] [facility] [address]    8 months ago Concentration deficit    Beny Vann MD    Office Visit    9 months ago [facility], [address] [facility] [address] [facility] [address], Loco Weber MD    Office Visit    10 months ago Depression with anxiety    Ct Napier MD    Office Visit                      Passed - CMP within past 12 months     No results found for this or any previous visit (from the past 4380 hour(s)). Passed - AST < 111 (less than 3 Xs the upper limit)     Lab Results   Component Value Date    AST 13 (L) 03/16/2022                     Passed - ALT < 168 (less than 3Xs the upper limit)     Lab Results   Component Value Date    ALT 20 03/16/2022                     Passed - CBC within the past 12 months     Recent Results (from the past 8760 hour(s))   CBC W/ DIFFERENTIAL    Collection Time: 03/16/22 12:09 PM   Result Value Ref Range    WBC 5.4 4.0 - 11.0 x10(3) uL    RBC 4.58 3.80 - 5.30 x10(6)uL    HGB 12.8 12.0 - 16.0 g/dL    HCT 43.5 35.0 - 48.0 %    MCV 95.0 80.0 - 100.0 fL    MCH 27.9 26.0 - 34.0 pg    MCHC 29.4 (L) 31.0 - 37.0 g/dL    RDW-SD 53.8 (H) 35.1 - 46.3 fL    RDW 15.2 (H) 11.0 - 15.0 %    .0 150.0 - 450.0 10(3)uL    Neutrophil Absolute Prelim 3.54 1.50 - 7.70 x10 (3) uL    Neutrophil Absolute 3.54 1.50 - 7.70 x10(3) uL    Lymphocyte Absolute 1.22 1.00 - 4.00 x10(3) uL    Monocyte Absolute 0.43 0.10 - 1.00 x10(3) uL    Eosinophil Absolute 0.16 0.00 - 0.70 x10(3) uL    Basophil Absolute 0.06 0.00 - 0.20 x10(3) uL    Immature Granulocyte Absolute 0.00 0.00 - 1.00 x10(3) uL    Neutrophil % 65.4 %    Lymphocyte % 22.6 %    Monocyte % 7.9 %    Eosinophil % 3.0 %    Basophil % 1.1 %    Immature Granulocyte % 0.0 %     *Note: Due to a large number of results and/or encounters for the requested time period, some results have not been displayed. A complete set of results can be found in Results Review.                  Passed - Hgb >/= 10g/dl within past 12 months     HGB   Date Value Ref Range Status   03/16/2022 12.8 12.0 - 16.0 g/dL Final                   Passed - Platelets >/= 108 past 12 months     PLT   Date Value Ref Range Status   03/16/2022 288.0 150.0 - 450.0 10(3)uL Final                     montelukast (SINGULAIR) 10 MG Oral Tab 30 tablet 2     Sig: Take 1 tablet (10 mg total) by mouth nightly. Asthma & COPD Medication Protocol Failed - 12/21/2022 10:11 PM        Failed - In person appointment or virtual visit in the past 6 mos or appointment in next 3 mos     Recent Outpatient Visits              4 months ago Crush injury of left foot, initial encounter    [address] [address] [address]    6 months ago Routine general medical examination at a health care facility    [address] [facility]    8 months ago Concentration deficit    [facility], [address], Charisma Serrato MD    Office Visit    9 months ago Annual physical exam    Gregg Alonzo MD    Office Visit    10 months ago Depression with anxiety    [place] Jennifer Gooden Agron B, MD    Office Visit                       Signed Prescriptions Disp Refills    levocetirizine 5 MG Oral Tab 90 tablet 1     Sig: Take 1 tablet (5 mg total) by mouth nightly.        Allergy Medication Protocol Passed - 12/21/2022 10:11 PM        Passed - In person appointment or virtual visit in the past 12 mos or appointment in next 3 mos     Recent Outpatient Visits              4 months ago Crush injury of left foot, initial encounter    [address] [address] Visit    6 months ago Routine general medical examination at a health care facility    [address] [facility]    8 months ago Concentration deficit    [address] [place], [address], Charisma Serrato MD    Office Visit    9 months ago Annual physical exam    Gregg Alonzo MD    Office Visit    10 months ago Depression with anxiety    [place] Charisma Gooden MD Office Visit

## 2023-01-18 RX ORDER — WARFARIN SODIUM 5 MG/1
TABLET ORAL
Qty: 45 TABLET | Refills: 0 | Status: CANCELLED | OUTPATIENT
Start: 2023-01-18

## 2023-01-18 RX ORDER — WARFARIN SODIUM 1 MG/1
1 TABLET ORAL NIGHTLY
Qty: 90 TABLET | Refills: 0 | Status: CANCELLED | OUTPATIENT
Start: 2023-01-18

## 2023-01-18 RX ORDER — MONTELUKAST SODIUM 10 MG/1
10 TABLET ORAL NIGHTLY
Qty: 30 TABLET | Refills: 2 | Status: CANCELLED | OUTPATIENT
Start: 2023-01-18

## 2023-01-27 ENCOUNTER — TELEPHONE (OUTPATIENT)
Dept: INTERNAL MEDICINE CLINIC | Facility: CLINIC | Age: 38
End: 2023-01-27

## 2023-01-27 NOTE — TELEPHONE ENCOUNTER
Patient requesting a callback as soon as possible due to wanting an appointment now if can be accommodated.

## 2023-01-31 ENCOUNTER — ANTI-COAG VISIT (OUTPATIENT)
Dept: ANTICOAGULATION | Facility: CLINIC | Age: 38
End: 2023-01-31

## 2023-01-31 DIAGNOSIS — Z51.81 ENCOUNTER FOR THERAPEUTIC DRUG MONITORING: ICD-10-CM

## 2023-01-31 DIAGNOSIS — Z79.01 MONITORING FOR LONG-TERM ANTICOAGULANT USE: ICD-10-CM

## 2023-01-31 DIAGNOSIS — G08 DURAL VENOUS SINUS THROMBOSIS: ICD-10-CM

## 2023-01-31 DIAGNOSIS — Z79.01 LONG TERM (CURRENT) USE OF ANTICOAGULANTS: ICD-10-CM

## 2023-01-31 DIAGNOSIS — I63.00 CEREBROVASCULAR ACCIDENT (CVA) DUE TO THROMBOSIS OF PRECEREBRAL ARTERY (HCC): ICD-10-CM

## 2023-01-31 DIAGNOSIS — D68.59 HYPERCOAGULABLE STATE (HCC): ICD-10-CM

## 2023-01-31 DIAGNOSIS — G08 THROMBOSIS TRANSVERSE SINUS: ICD-10-CM

## 2023-01-31 DIAGNOSIS — Z51.81 MONITORING FOR LONG-TERM ANTICOAGULANT USE: ICD-10-CM

## 2023-01-31 LAB
INR: 1.6 (ref 0.8–1.2)
TEST STRIP EXPIRATION DATE: ABNORMAL DATE

## 2023-01-31 PROCEDURE — 85610 PROTHROMBIN TIME: CPT | Performed by: INTERNAL MEDICINE

## 2023-01-31 PROCEDURE — 93793 ANTICOAG MGMT PT WARFARIN: CPT | Performed by: INTERNAL MEDICINE

## 2023-01-31 NOTE — PROGRESS NOTES
Face to face. Confirmed daily warfarin dose and last Anticoag visit was a few months ago- she continues to take 5 mg nightly. Reports she missed a few doses in the last couple weeks. INR is minimally below therapeutic goal range. Per protocol, patient to continue current dose and recheck INR in 4 weeks.

## 2023-02-07 RX ORDER — LEVOCETIRIZINE DIHYDROCHLORIDE 5 MG/1
5 TABLET, FILM COATED ORAL NIGHTLY
Qty: 90 TABLET | Refills: 1 | Status: SHIPPED | OUTPATIENT
Start: 2023-02-07

## 2023-02-07 RX ORDER — MONTELUKAST SODIUM 10 MG/1
10 TABLET ORAL NIGHTLY
Qty: 30 TABLET | Refills: 2 | Status: SHIPPED | OUTPATIENT
Start: 2023-02-07

## 2023-02-07 NOTE — TELEPHONE ENCOUNTER
Refill passed per [facility] protocol. Requested Prescriptions   Pending Prescriptions Disp Refills    levocetirizine 5 MG Oral Tab 90 tablet 1     Sig: Take 1 tablet (5 mg total) by mouth nightly. Allergy Medication Protocol Passed - 2/6/2023  4:42 PM        Passed - In person appointment or virtual visit in the past 12 mos or appointment in next 3 mos     Recent Outpatient Visits              6 months ago Crush injury of left foot, initial encounter    [address] [place] [address] [address] 72 Visit    8 months ago Routine general medical examination at a health care facility    [address] [facility]    10 months ago Concentration deficit    [address] [facility], Genesis Valverde MD    Office Visit    10 months ago Annual physical exam    Zach Ventura MD    Office Visit    12 months ago Depression with anxiety    Marielos Aguilar, [address], Genesis Valverde MD    Office Visit          Future Appointments         Provider Department Appt Notes    In 3 weeks Raj Mccann RN [facility], [address] [place] [address]                  warfarin 1 MG Oral Tab 90 tablet 0     Sig: Take 1 tablet (1 mg total) by mouth nightly.        Rx Em Warfarin Protocol Passed - 2/6/2023  4:42 PM        Passed - Appointment in past 12 or next 3 months     Recent Outpatient Visits              6 months ago Crush injury of left foot, initial encounter    [address] [address]    8 months ago Routine general medical examination at a health care facility    [address] [facility]    10 months ago Concentration deficit    [address] [facility], Genesis Valverde MD    Office Visit    10 months ago Annual physical exam    Marielos Aguilar, [address], Renetta Talavera MD    Office Visit    12 months ago Depression with anxiety    [address] [facility], Gaston Garcia MD    Office Visit          Future Appointments         Provider Department Appt Notes    In 3 weeks Vinay Avelar RN [facility], [facility]                Passed - INR 3.9 or less past 6 weeks     INR   Date Value Ref Range Status   01/31/2023 1.6 (A) 0.8 - 1.2 Final                   Passed - CMP within past 12 months     No results found for this or any previous visit (from the past 4380 hour(s)).                 Passed - AST < 111 (less than 3 Xs the upper limit)     Lab Results   Component Value Date    AST 13 (L) 03/16/2022                     Passed - ALT < 168 (less than 3Xs the upper limit)     Lab Results   Component Value Date    ALT 20 03/16/2022                     Passed - CBC within the past 12 months     Recent Results (from the past 8760 hour(s))   CBC W/ DIFFERENTIAL    Collection Time: 03/16/22 12:09 PM   Result Value Ref Range    WBC 5.4 4.0 - 11.0 x10(3) uL    RBC 4.58 3.80 - 5.30 x10(6)uL    HGB 12.8 12.0 - 16.0 g/dL    HCT 43.5 35.0 - 48.0 %    MCV 95.0 80.0 - 100.0 fL    MCH 27.9 26.0 - 34.0 pg    MCHC 29.4 (L) 31.0 - 37.0 g/dL    RDW-SD 53.8 (H) 35.1 - 46.3 fL    RDW 15.2 (H) 11.0 - 15.0 %    .0 150.0 - 450.0 10(3)uL    Neutrophil Absolute Prelim 3.54 1.50 - 7.70 x10 (3) uL    Neutrophil Absolute 3.54 1.50 - 7.70 x10(3) uL    Lymphocyte Absolute 1.22 1.00 - 4.00 x10(3) uL    Monocyte Absolute 0.43 0.10 - 1.00 x10(3) uL    Eosinophil Absolute 0.16 0.00 - 0.70 x10(3) uL    Basophil Absolute 0.06 0.00 - 0.20 x10(3) uL    Immature Granulocyte Absolute 0.00 0.00 - 1.00 x10(3) uL    Neutrophil % 65.4 %    Lymphocyte % 22.6 %    Monocyte % 7.9 %    Eosinophil % 3.0 %    Basophil % 1.1 %    Immature Granulocyte % 0.0 %     *Note: Due to a large number of results and/or encounters for the requested time period, some results have not been displayed. A complete set of results can be found in Results Review. Passed - Hgb >/= 10g/dl within past 12 months     HGB   Date Value Ref Range Status   03/16/2022 12.8 12.0 - 16.0 g/dL Final                   Passed - Platelets >/= 583 past 12 months     PLT   Date Value Ref Range Status   03/16/2022 288.0 150.0 - 450.0 10(3)uL Final                     warfarin 5 MG Oral Tab 45 tablet 0     Sig: TAKE 1 AND 1/2 TABLETS BY MOUTH ON MONDAY, 1 TABLET DAILY ON THE REST OF THE WEEK AS DIRECTED BY COUMADIN CLINIC. Rx Em Warfarin Protocol Passed - 2/6/2023  4:42 PM        Passed - Appointment in past 12 or next 3 months     Recent Outpatient Visits              6 months ago Crush injury of left foot, initial encounter    [address] [address]    8 months ago Routine general medical examination at a health care facility    [address] [facility]    10 months ago Concentration deficit    Darshana Eldridge, Daniel Patino MD    Office Visit    10 months ago Annual physical exam    Manish Ly MD    Office Visit    12 months ago Depression with anxiety    [address] [facility],[address], [address] [facility],[address], Daniel Patino MD    Office Visit          Future Appointments         Provider Department Appt Notes    In 3 weeks Yue Bustillo, [facility], [address] [place] [address]                Passed - INR 3.9 or less past 6 weeks     INR   Date Value Ref Range Status   01/31/2023 1.6 (A) 0.8 - 1.2 Final                   Passed - CMP within past 12 months     No results found for this or any previous visit (from the past 4380 hour(s)).                 Passed - AST < 111 (less than 3 Xs the upper limit)     Lab Results   Component Value Date    AST 13 (L) 03/16/2022 Passed - ALT < 168 (less than 3Xs the upper limit)     Lab Results   Component Value Date    ALT 20 03/16/2022                     Passed - CBC within the past 12 months     Recent Results (from the past 8760 hour(s))   CBC W/ DIFFERENTIAL    Collection Time: 03/16/22 12:09 PM   Result Value Ref Range    WBC 5.4 4.0 - 11.0 x10(3) uL    RBC 4.58 3.80 - 5.30 x10(6)uL    HGB 12.8 12.0 - 16.0 g/dL    HCT 43.5 35.0 - 48.0 %    MCV 95.0 80.0 - 100.0 fL    MCH 27.9 26.0 - 34.0 pg    MCHC 29.4 (L) 31.0 - 37.0 g/dL    RDW-SD 53.8 (H) 35.1 - 46.3 fL    RDW 15.2 (H) 11.0 - 15.0 %    .0 150.0 - 450.0 10(3)uL    Neutrophil Absolute Prelim 3.54 1.50 - 7.70 x10 (3) uL    Neutrophil Absolute 3.54 1.50 - 7.70 x10(3) uL    Lymphocyte Absolute 1.22 1.00 - 4.00 x10(3) uL    Monocyte Absolute 0.43 0.10 - 1.00 x10(3) uL    Eosinophil Absolute 0.16 0.00 - 0.70 x10(3) uL    Basophil Absolute 0.06 0.00 - 0.20 x10(3) uL    Immature Granulocyte Absolute 0.00 0.00 - 1.00 x10(3) uL    Neutrophil % 65.4 %    Lymphocyte % 22.6 %    Monocyte % 7.9 %    Eosinophil % 3.0 %    Basophil % 1.1 %    Immature Granulocyte % 0.0 %     *Note: Due to a large number of results and/or encounters for the requested time period, some results have not been displayed. A complete set of results can be found in Results Review. Passed - Hgb >/= 10g/dl within past 12 months     HGB   Date Value Ref Range Status   03/16/2022 12.8 12.0 - 16.0 g/dL Final                   Passed - Platelets >/= 202 past 12 months     PLT   Date Value Ref Range Status   03/16/2022 288.0 150.0 - 450.0 10(3)uL Final                     montelukast (SINGULAIR) 10 MG Oral Tab 30 tablet 2     Sig: Take 1 tablet (10 mg total) by mouth nightly.        Asthma & COPD Medication Protocol Failed - 2/6/2023  4:42 PM        Failed - In person appointment or virtual visit in the past 6 mos or appointment in next 3 mos     Recent Outpatient Visits              6 months ago Crush injury of left foot, initial encounter    [address] [facility] [address]    8 months ago Routine general medical examination at a health care facility    [address] [facility] [address]    10 months ago Concentration deficit    Laura Rosen MD    Office Visit    10 months ago Annual physical exam    Arnoldo Field MD    Office Visit    12 months ago Depression with anxiety    Laura Rosen MD    Office Visit          Future Appointments         Provider Department Appt Notes    In 3 weeks [facility] Easley Ashleyberg                     Recent Outpatient Visits              6 months ago Crush injury of left foot, initial encounter    [address] [facility] [address]    8 months ago Routine general medical examination at a health care facility    [address] [facility] [address]    10 months ago Concentration deficit    Arnoldo Field MD    Office Visit    10 months ago Annual physical exam    Jose Roberto Sawyer, [address], Avtar Rodriguez MD    Office Visit    12 months ago Depression with anxiety    Jose Roberto Sawyer, [address], Laura Guallpa MD    Office Visit            Future Appointments         Provider Department Appt Notes    In 3 weeks [facility] Easley Ashleyberg

## 2023-02-09 RX ORDER — WARFARIN SODIUM 1 MG/1
1 TABLET ORAL NIGHTLY
Qty: 90 TABLET | Refills: 0 | OUTPATIENT
Start: 2023-02-09

## 2023-02-09 RX ORDER — WARFARIN SODIUM 5 MG/1
TABLET ORAL
Qty: 30 TABLET | Refills: 0 | Status: SHIPPED | OUTPATIENT
Start: 2023-02-09

## 2023-02-09 NOTE — TELEPHONE ENCOUNTER
Refill passed per [facility] protocol. Refill for Warfarin 1mg tab denied, patient has not needed 1mg tab in over 6 months. Script for 5mg refilled for 30 days due to patient compliance. Requested Prescriptions   Pending Prescriptions Disp Refills    warfarin 5 MG Oral Tab 30 tablet 0     Sig: Take 1 tab (5mg) daily or as directed by Coumadin Clinic       Rx Em Warfarin Protocol Passed - 2/7/2023  5:03 PM        Passed - Appointment in past 12 or next 3 months     Recent Outpatient Visits              6 months ago Crush injury of left foot, initial encounter    [address]    8 months ago Routine general medical examination at a health care facility    [address] [facility] [address]    10 months ago Concentration deficit    [address] [place] Natarajan MD    Office Visit    11 months ago Annual physical exam    [address] [place],[address], [address] [facility],[address], En[place]Avtar MD    Office Visit    12 months ago Depression with anxiety    [facility]    [facility]          Future Appointments         Provider Department Appt Notes    In 2 weeks Mason Walker RN [facility], [address] [place] [address]                Passed - INR 3.9 or less past 6 weeks     INR   Date Value Ref Range Status   01/31/2023 1.6 (A) 0.8 - 1.2 Final                   Passed - CMP within past 12 months     No results found for this or any previous visit (from the past 4380 hour(s)).                 Passed - AST < 111 (less than 3 Xs the upper limit)     Lab Results   Component Value Date    AST 13 (L) 03/16/2022                     Passed - ALT < 168 (less than 3Xs the upper limit)     Lab Results   Component Value Date    ALT 20 03/16/2022                     Passed - CBC within the past 12 months     Recent Results (from the past 8760 hour(s))   CBC W/ DIFFERENTIAL    Collection Time: 03/16/22 12:09 PM   Result Value Ref Range    WBC 5.4 4.0 - 11.0 x10(3) uL    RBC 4.58 3.80 - 5.30 x10(6)uL    HGB 12.8 12.0 - 16.0 g/dL    HCT 43.5 35.0 - 48.0 %    MCV 95.0 80.0 - 100.0 fL    MCH 27.9 26.0 - 34.0 pg    MCHC 29.4 (L) 31.0 - 37.0 g/dL    RDW-SD 53.8 (H) 35.1 - 46.3 fL    RDW 15.2 (H) 11.0 - 15.0 %    .0 150.0 - 450.0 10(3)uL    Neutrophil Absolute Prelim 3.54 1.50 - 7.70 x10 (3) uL    Neutrophil Absolute 3.54 1.50 - 7.70 x10(3) uL    Lymphocyte Absolute 1.22 1.00 - 4.00 x10(3) uL    Monocyte Absolute 0.43 0.10 - 1.00 x10(3) uL    Eosinophil Absolute 0.16 0.00 - 0.70 x10(3) uL    Basophil Absolute 0.06 0.00 - 0.20 x10(3) uL    Immature Granulocyte Absolute 0.00 0.00 - 1.00 x10(3) uL    Neutrophil % 65.4 %    Lymphocyte % 22.6 %    Monocyte % 7.9 %    Eosinophil % 3.0 %    Basophil % 1.1 %    Immature Granulocyte % 0.0 %     *Note: Due to a large number of results and/or encounters for the requested time period, some results have not been displayed. A complete set of results can be found in Results Review. Passed - Hgb >/= 10g/dl within past 12 months     HGB   Date Value Ref Range Status   03/16/2022 12.8 12.0 - 16.0 g/dL Final                   Passed - Platelets >/= 537 past 12 months     PLT   Date Value Ref Range Status   03/16/2022 288.0 150.0 - 450.0 10(3)uL Final                    Signed Prescriptions Disp Refills    levocetirizine 5 MG Oral Tab 90 tablet 1     Sig: Take 1 tablet (5 mg total) by mouth nightly.        Allergy Medication Protocol Passed - 2/6/2023  4:42 PM        Passed - In person appointment or virtual visit in the past 12 mos or appointment in next 3 mos     Recent Outpatient Visits              6 months ago Crush injury of left foot, initial encounter    [address] [address] [address] 72 Visit    8 months ago Routine general medical examination at a health care facility [address] [address] [address]    10 months ago Concentration deficit    Mag Hathaway MD    Office Visit    11 months ago Annual physical exam    Loco Harris MD    Office Visit    12 months ago Depression with anxiety    [address] [place],[address], [address] [facility] [address] [facility] [address], Woodrow Lau MD    Office Visit          Future Appointments         Provider Department Appt Notes    In 2 weeks Daryn Segovia RN [facility], [address] [place] [address]                  montelukast (SINGULAIR) 10 MG Oral Tab 30 tablet 2     Sig: Take 1 tablet (10 mg total) by mouth nightly. Asthma & COPD Medication Protocol Failed - 2/6/2023  4:42 PM        Failed - In person appointment or virtual visit in the past 6 mos or appointment in next 3 mos     Recent Outpatient Visits              6 months ago Crush injury of left foot, initial encounter    [address] [address] [address]    8 months ago Routine general medical examination at a health care facility    [address] [facility]    10 months ago Concentration deficit    Orestes Chau, Woodrow Lau MD    Office Visit    11 months ago Annual physical exam    Mag Hathaway MD    Office Visit    12 months ago Depression with anxiety    Orestes Chau [place], Renard Townsend MD    Office Visit          Future Appointments         Provider Department Appt Notes    In 2 weeks Daryn Segovia RN [facility], [address] [place] [address]                 Refused Prescriptions Disp Refills    warfarin 1 MG Oral Tab 90 tablet 0     Sig: Take 1 tablet (1 mg total) by mouth nightly.        Rx Em Warfarin Protocol Passed - 2/7/2023  5:03 PM        Passed - Appointment in past 12 or next 3 months     Recent Outpatient Visits              6 months ago Crush injury of left foot, initial encounter    [address] [facility] [address]    8 months ago Routine general medical examination at a health care facility    [address] [facility] [address]    10 months ago Concentration deficit    Kadeem Stearns, Ever Viramontes MD    Office Visit    11 months ago Annual physical exam    Santiago Chappell MD    Office Visit    12 months ago Depression with anxiety    Randa Nunez, [address], Ever Viramontes MD    Office Visit          Future Appointments         Provider Department Appt Notes    In 2 weeks [facility]                Passed - INR 3.9 or less past 6 weeks     INR   Date Value Ref Range Status   01/31/2023 1.6 (A) 0.8 - 1.2 Final                   Passed - CMP within past 12 months     No results found for this or any previous visit (from the past 4380 hour(s)).                 Passed - AST < 111 (less than 3 Xs the upper limit)     Lab Results   Component Value Date    AST 13 (L) 03/16/2022                     Passed - ALT < 168 (less than 3Xs the upper limit)     Lab Results   Component Value Date    ALT 20 03/16/2022                     Passed - CBC within the past 12 months     Recent Results (from the past 8760 hour(s))   CBC W/ DIFFERENTIAL    Collection Time: 03/16/22 12:09 PM   Result Value Ref Range    WBC 5.4 4.0 - 11.0 x10(3) uL    RBC 4.58 3.80 - 5.30 x10(6)uL    HGB 12.8 12.0 - 16.0 g/dL    HCT 43.5 35.0 - 48.0 %    MCV 95.0 80.0 - 100.0 fL    MCH 27.9 26.0 - 34.0 pg    MCHC 29.4 (L) 31.0 - 37.0 g/dL    RDW-SD 53.8 (H) 35.1 - 46.3 fL    RDW 15.2 (H) 11.0 - 15.0 %    .0 150.0 - 450.0 10(3)uL    Neutrophil Absolute Prelim 3.54 1.50 - 7.70 x10 (3) uL    Neutrophil Absolute 3.54 1.50 - 7.70 x10(3) uL    Lymphocyte Absolute 1.22 1.00 - 4.00 x10(3) uL    Monocyte Absolute 0.43 0.10 - 1.00 x10(3) uL    Eosinophil Absolute 0.16 0.00 - 0.70 x10(3) uL    Basophil Absolute 0.06 0.00 - 0.20 x10(3) uL    Immature Granulocyte Absolute 0.00 0.00 - 1.00 x10(3) uL    Neutrophil % 65.4 %    Lymphocyte % 22.6 %    Monocyte % 7.9 %    Eosinophil % 3.0 %    Basophil % 1.1 %    Immature Granulocyte % 0.0 %     *Note: Due to a large number of results and/or encounters for the requested time period, some results have not been displayed. A complete set of results can be found in Results Review.                  Passed - Hgb >/= 10g/dl within past 12 months     HGB   Date Value Ref Range Status   03/16/2022 12.8 12.0 - 16.0 g/dL Final                   Passed - Platelets >/= 078 past 12 months     PLT   Date Value Ref Range Status   03/16/2022 288.0 150.0 - 450.0 10(3)uL Final

## 2023-02-28 ENCOUNTER — ANTI-COAG VISIT (OUTPATIENT)
Dept: ANTICOAGULATION | Facility: CLINIC | Age: 38
End: 2023-02-28

## 2023-02-28 ENCOUNTER — TELEPHONE (OUTPATIENT)
Dept: ANTICOAGULATION | Facility: CLINIC | Age: 38
End: 2023-02-28

## 2023-02-28 DIAGNOSIS — Z51.81 ENCOUNTER FOR THERAPEUTIC DRUG MONITORING: ICD-10-CM

## 2023-02-28 DIAGNOSIS — Z51.81 MONITORING FOR LONG-TERM ANTICOAGULANT USE: ICD-10-CM

## 2023-02-28 DIAGNOSIS — I63.00 CEREBROVASCULAR ACCIDENT (CVA) DUE TO THROMBOSIS OF PRECEREBRAL ARTERY (HCC): ICD-10-CM

## 2023-02-28 DIAGNOSIS — D68.59 HYPERCOAGULABLE STATE (HCC): ICD-10-CM

## 2023-02-28 DIAGNOSIS — G08 THROMBOSIS TRANSVERSE SINUS: ICD-10-CM

## 2023-02-28 DIAGNOSIS — Z79.01 LONG TERM (CURRENT) USE OF ANTICOAGULANTS: ICD-10-CM

## 2023-02-28 DIAGNOSIS — G08 DURAL VENOUS SINUS THROMBOSIS: ICD-10-CM

## 2023-02-28 DIAGNOSIS — Z79.01 MONITORING FOR LONG-TERM ANTICOAGULANT USE: ICD-10-CM

## 2023-02-28 LAB
INR: 1.3 (ref 0.8–1.2)
TEST STRIP EXPIRATION DATE: ABNORMAL DATE

## 2023-02-28 PROCEDURE — 85610 PROTHROMBIN TIME: CPT | Performed by: INTERNAL MEDICINE

## 2023-02-28 PROCEDURE — 93793 ANTICOAG MGMT PT WARFARIN: CPT | Performed by: INTERNAL MEDICINE

## 2023-03-21 ENCOUNTER — TELEPHONE (OUTPATIENT)
Dept: ANTICOAGULATION | Facility: CLINIC | Age: 38
End: 2023-03-21

## 2023-04-03 RX ORDER — WARFARIN SODIUM 5 MG/1
TABLET ORAL
Qty: 30 TABLET | Refills: 0 | Status: SHIPPED | OUTPATIENT
Start: 2023-04-03

## 2023-04-03 RX ORDER — LEVOCETIRIZINE DIHYDROCHLORIDE 5 MG/1
5 TABLET, FILM COATED ORAL NIGHTLY
Qty: 90 TABLET | Refills: 1 | Status: SHIPPED | OUTPATIENT
Start: 2023-04-03

## 2023-04-03 RX ORDER — MONTELUKAST SODIUM 10 MG/1
10 TABLET ORAL NIGHTLY
Qty: 30 TABLET | Refills: 2 | Status: SHIPPED | OUTPATIENT
Start: 2023-04-03

## 2023-04-14 ENCOUNTER — TELEPHONE (OUTPATIENT)
Dept: ANTICOAGULATION | Facility: CLINIC | Age: 38
End: 2023-04-14

## 2023-05-19 ENCOUNTER — ANTI-COAG VISIT (OUTPATIENT)
Dept: ANTICOAGULATION | Facility: CLINIC | Age: 38
End: 2023-05-19

## 2023-05-19 DIAGNOSIS — Z79.01 MONITORING FOR LONG-TERM ANTICOAGULANT USE: ICD-10-CM

## 2023-05-19 DIAGNOSIS — Z51.81 ENCOUNTER FOR THERAPEUTIC DRUG MONITORING: ICD-10-CM

## 2023-05-19 DIAGNOSIS — G08 DURAL VENOUS SINUS THROMBOSIS: ICD-10-CM

## 2023-05-19 DIAGNOSIS — D68.59 HYPERCOAGULABLE STATE (HCC): ICD-10-CM

## 2023-05-19 DIAGNOSIS — Z79.01 LONG TERM (CURRENT) USE OF ANTICOAGULANTS: ICD-10-CM

## 2023-05-19 DIAGNOSIS — G08 THROMBOSIS TRANSVERSE SINUS: ICD-10-CM

## 2023-05-19 DIAGNOSIS — I63.00 CEREBROVASCULAR ACCIDENT (CVA) DUE TO THROMBOSIS OF PRECEREBRAL ARTERY (HCC): ICD-10-CM

## 2023-05-19 DIAGNOSIS — Z51.81 MONITORING FOR LONG-TERM ANTICOAGULANT USE: ICD-10-CM

## 2023-05-19 LAB
INR: 1.4 (ref 0.8–1.2)
TEST STRIP EXPIRATION DATE: ABNORMAL DATE

## 2023-05-19 PROCEDURE — 93793 ANTICOAG MGMT PT WARFARIN: CPT | Performed by: INTERNAL MEDICINE

## 2023-05-19 PROCEDURE — 85610 PROTHROMBIN TIME: CPT | Performed by: INTERNAL MEDICINE

## 2023-05-26 NOTE — TELEPHONE ENCOUNTER
Please review; refill failed per [facility] protocol. Failed due to patient overdue for labs. Pended medication order- sign if appropriate. Requested Prescriptions   Pending Prescriptions Disp Refills    warfarin 5 MG Oral Tab 100 tablet 1     Sig: Take 1 tablet (5 mg total) by mouth nightly. Take as directed by the coumadin clinic. Rx Em Warfarin Protocol Failed - 5/26/2023  6:16 PM        Failed - CMP within past 12 months     No results found for this or any previous visit (from the past 4380 hour(s)). Failed - AST < 111 (less than 3 Xs the upper limit)     No results found for: AST                    Failed - ALT < 168 (less than 3Xs the upper limit)     No results found for: ALT                    Failed - CBC within the past 12 months     No results found for this or any previous visit (from the past 8760 hour(s)).                 Failed - Hgb >/= 10g/dl within past 12 months     HGB   Date Value Ref Range Status   03/16/2022 12.8 12.0 - 16.0 g/dL Final                     Failed - Platelets >/= 325 past 12 months     PLT   Date Value Ref Range Status   03/16/2022 288.0 150.0 - 450.0 10(3)uL Final                     Passed - Appointment in past 12 or next 3 months     Recent Outpatient Visits              9 months ago Crush injury of left foot, initial encounter    [address]    12 months ago Routine general medical examination at a health care facility    [address]    1 year ago Concentration deficit    [address], Shedrick Cabot, MD    Office Visit    1 year ago Annual physical exam    [address]    Office Visit    1 year ago Depression with anxiety    [address], Shedrick Cabot, MD    Office Visit          Future Appointments Provider Department Appt Notes    In 1 week Bernardo Treadwell RN [facility], [address]                Passed - INR 3.9 or less past 6 weeks     INR   Date Value Ref Range Status   05/19/2023 1.4 (A) 0.8 - 1.2 Final

## 2023-05-26 NOTE — TELEPHONE ENCOUNTER
Patient was overdue for INR check and initial request for refill failed per protocol. INR check was done on 5/19/23.

## 2023-05-27 RX ORDER — WARFARIN SODIUM 5 MG/1
5 TABLET ORAL NIGHTLY
Qty: 100 TABLET | Refills: 0 | Status: SHIPPED | OUTPATIENT
Start: 2023-05-27

## 2023-06-02 ENCOUNTER — TELEPHONE (OUTPATIENT)
Dept: ANTICOAGULATION | Facility: CLINIC | Age: 38
End: 2023-06-02

## 2023-08-28 ENCOUNTER — TELEPHONE (OUTPATIENT)
Dept: ANTICOAGULATION | Facility: CLINIC | Age: 38
End: 2023-08-28

## 2023-08-28 RX ORDER — WARFARIN SODIUM 5 MG/1
5 TABLET ORAL NIGHTLY
Qty: 90 TABLET | Refills: 0 | Status: SHIPPED | OUTPATIENT
Start: 2023-08-28

## 2023-08-28 RX ORDER — WARFARIN SODIUM 5 MG/1
5 TABLET ORAL NIGHTLY
Qty: 90 TABLET | Refills: 0 | Status: SHIPPED | OUTPATIENT
Start: 2023-08-28 | End: 2023-08-28

## 2023-08-28 NOTE — TELEPHONE ENCOUNTER
Patient is overdue for INR. Reports has not scheduled appt with Coumadin Clinic or with PCP because she currently has no health insurance. Has been off Coumadin for 2 months. PCP notified, refill sent to [facility] Patient notified via  [employer].

## 2023-08-28 NOTE — TELEPHONE ENCOUNTER
Script approved by PCP, resent to [facility] for drug prescription program.

## 2023-08-28 NOTE — TELEPHONE ENCOUNTER
Failed per [facility] Protocol    *patient reports currently has no health insurance, has been off Coumadin 2 months. Requesting refill until she resumes health insurance. Requested Prescriptions   Pending Prescriptions Disp Refills    warfarin 5 MG Oral Tab 90 tablet 0     Sig: Take 1 tablet (5 mg total) by mouth nightly. or as directed by the coumadin clinic. Rx Em Warfarin Protocol Failed - 8/28/2023  5:29 PM        Failed - Appointment in past 12 or next 3 months     Recent Outpatient Visits              1 year ago Crush injury of left foot, initial encounter    [address] [address]    1 year ago Routine general medical examination at a health care facility    [address] [facility] [address]    1 year ago Concentration deficit    Shaina Sparrow MD    Office Visit    1 year ago Annual physical exam    Sahina Sparrow MD    Office Visit    1 year ago Depression with anxiety    [address] [facility], Rodriguez Rodriguez MD    Office Visit                      Failed - INR 3.9 or less past 6 weeks     INR   Date Value Ref Range Status   05/19/2023 1.4 (A) 0.8 - 1.2 Final                   Failed - CMP within past 12 months     No results found for this or any previous visit (from the past 4380 hour(s)). Failed - AST < 111 (less than 3 Xs the upper limit)     No results found for: AST                  Failed - ALT < 168 (less than 3Xs the upper limit)     No results found for: ALT                  Failed - CBC within the past 12 months     No results found for this or any previous visit (from the past 8760 hour(s)).               Failed - Hgb >/= 10g/dl within past 12 months     HGB   Date Value Ref Range Status   03/16/2022 12.8 12.0 - 16.0 g/dL Final                   Failed - Platelets >/= 151 past 12 months     PLT   Date Value Ref Range Status   03/16/2022 288.0 150.0 - 450.0 10(3)uL Final                        Recent Outpatient Visits              1 year ago Crush injury of left foot, initial encounter    [address] [address] [address]    1 year ago Routine general medical examination at a health care facility    [address] [facility]    1 year ago Concentration deficit    [address] Huang MD    Office Visit    1 year ago Annual physical exam    Ainsley Trevino MD    Office Visit    1 year ago Depression with anxiety    [address] [place] Joseph Natarajan MD    Office Visit

## 2023-10-02 NOTE — TELEPHONE ENCOUNTER
Generalized message left as there was no identification on the voicemail. Asked Rob Ivan to return call to the clinic to discuss labs and left number to return call. My chart message sent with the same info.

## 2023-10-10 ENCOUNTER — MED REC SCAN ONLY (OUTPATIENT)
Dept: INTERNAL MEDICINE CLINIC | Facility: CLINIC | Age: 38
End: 2023-10-10

## 2024-01-26 NOTE — PROGRESS NOTES
Detail Level: Generalized
Spoke to pt. INR instructions provided to pt. Pt verbalized understanding of whole message with no further questions at this time. Call was transferred to [facility] to schedule next INR draw.
Detail Level: Detailed
Detail Level: Zone
Tazorac Pregnancy And Lactation Text: This medication is not safe during pregnancy. It is unknown if this medication is excreted in breast milk.
Dapsone Pregnancy And Lactation Text: This medication is Pregnancy Category C and is not considered safe during pregnancy or breast feeding.
Spironolactone Counseling: Patient advised regarding risks of diarrhea, abdominal pain, hyperkalemia, birth defects (for female patients), liver toxicity and renal toxicity. The patient may need blood work to monitor liver and kidney function and potassium levels while on therapy. The patient verbalized understanding of the proper use and possible adverse effects of spironolactone.  All of the patient's questions and concerns were addressed.
Bactrim Pregnancy And Lactation Text: This medication is Pregnancy Category D and is known to cause fetal risk.  It is also excreted in breast milk.
Winlevi Pregnancy And Lactation Text: This medication is considered safe during pregnancy and breastfeeding.
Minocycline Counseling: Patient advised regarding possible photosensitivity and discoloration of the teeth, skin, lips, tongue and gums.  Patient instructed to avoid sunlight, if possible.  When exposed to sunlight, patients should wear protective clothing, sunglasses, and sunscreen.  The patient was instructed to call the office immediately if the following severe adverse effects occur:  hearing changes, easy bruising/bleeding, severe headache, or vision changes.  The patient verbalized understanding of the proper use and possible adverse effects of minocycline.  All of the patient's questions and concerns were addressed.
Isotretinoin Counseling: Patient should get monthly blood tests, not donate blood, not drive at night if vision affected, not share medication, and not undergo elective surgery for 6 months after tx completed. Side effects reviewed, pt to contact office should one occur.
Spironolactone Pregnancy And Lactation Text: This medication can cause feminization of the male fetus and should be avoided during pregnancy. The active metabolite is also found in breast milk.
Doxycycline Counseling:  Patient counseled regarding possible photosensitivity and increased risk for sunburn.  Patient instructed to avoid sunlight, if possible.  When exposed to sunlight, patients should wear protective clothing, sunglasses, and sunscreen.  The patient was instructed to call the office immediately if the following severe adverse effects occur:  hearing changes, easy bruising/bleeding, severe headache, or vision changes.  The patient verbalized understanding of the proper use and possible adverse effects of doxycycline.  All of the patient's questions and concerns were addressed.
Benzoyl Peroxide Pregnancy And Lactation Text: This medication is Pregnancy Category C. It is unknown if benzoyl peroxide is excreted in breast milk.
Birth Control Pills Counseling: Birth Control Pill Counseling: I discussed with the patient the potential side effects of OCPs including but not limited to increased risk of stroke, heart attack, thrombophlebitis, deep venous thrombosis, hepatic adenomas, breast changes, GI upset, headaches, and depression.  The patient verbalized understanding of the proper use and possible adverse effects of OCPs. All of the patient's questions and concerns were addressed.
Minocycline Pregnancy And Lactation Text: This medication is Pregnancy Category D and not consider safe during pregnancy. It is also excreted in breast milk.
Topical Retinoid counseling:  Patient advised to apply a pea-sized amount only at bedtime and wait 30 minutes after washing their face before applying.  If too drying, patient may add a non-comedogenic moisturizer. The patient verbalized understanding of the proper use and possible adverse effects of retinoids.  All of the patient's questions and concerns were addressed.
Isotretinoin Pregnancy And Lactation Text: This medication is Pregnancy Category X and is considered extremely dangerous during pregnancy. It is unknown if it is excreted in breast milk.
Azelaic Acid Counseling: Patient counseled that medicine may cause skin irritation and to avoid applying near the eyes.  In the event of skin irritation, the patient was advised to reduce the amount of the drug applied or use it less frequently.   The patient verbalized understanding of the proper use and possible adverse effects of azelaic acid.  All of the patient's questions and concerns were addressed.
Azithromycin Counseling:  I discussed with the patient the risks of azithromycin including but not limited to GI upset, allergic reaction, drug rash, diarrhea, and yeast infections.
Topical Clindamycin Counseling: Patient counseled that this medication may cause skin irritation or allergic reactions.  In the event of skin irritation, the patient was advised to reduce the amount of the drug applied or use it less frequently.   The patient verbalized understanding of the proper use and possible adverse effects of clindamycin.  All of the patient's questions and concerns were addressed.
Tetracycline Counseling: Patient counseled regarding possible photosensitivity and increased risk for sunburn.  Patient instructed to avoid sunlight, if possible.  When exposed to sunlight, patients should wear protective clothing, sunglasses, and sunscreen.  The patient was instructed to call the office immediately if the following severe adverse effects occur:  hearing changes, easy bruising/bleeding, severe headache, or vision changes.  The patient verbalized understanding of the proper use and possible adverse effects of tetracycline.  All of the patient's questions and concerns were addressed. Patient understands to avoid pregnancy while on therapy due to potential birth defects.
Doxycycline Pregnancy And Lactation Text: This medication is Pregnancy Category D and not consider safe during pregnancy. It is also excreted in breast milk but is considered safe for shorter treatment courses.
Topical Clindamycin Pregnancy And Lactation Text: This medication is Pregnancy Category B and is considered safe during pregnancy. It is unknown if it is excreted in breast milk.
Birth Control Pills Pregnancy And Lactation Text: This medication should be avoided if pregnant and for the first 30 days post-partum.
Sarecycline Counseling: Patient advised regarding possible photosensitivity and discoloration of the teeth, skin, lips, tongue and gums.  Patient instructed to avoid sunlight, if possible.  When exposed to sunlight, patients should wear protective clothing, sunglasses, and sunscreen.  The patient was instructed to call the office immediately if the following severe adverse effects occur:  hearing changes, easy bruising/bleeding, severe headache, or vision changes.  The patient verbalized understanding of the proper use and possible adverse effects of sarecycline.  All of the patient's questions and concerns were addressed.
Azelaic Acid Pregnancy And Lactation Text: This medication is considered safe during pregnancy and breast feeding.
Azithromycin Pregnancy And Lactation Text: This medication is considered safe during pregnancy and is also secreted in breast milk.
High Dose Vitamin A Counseling: Side effects reviewed, pt to contact office should one occur.
Erythromycin Counseling:  I discussed with the patient the risks of erythromycin including but not limited to GI upset, allergic reaction, drug rash, diarrhea, increase in liver enzymes, and yeast infections.
Topical Retinoid Pregnancy And Lactation Text: This medication is Pregnancy Category C. It is unknown if this medication is excreted in breast milk.
Winlevi Counseling:  I discussed with the patient the risks of topical clascoterone including but not limited to erythema, scaling, itching, and stinging. Patient voiced their understanding.
Tazorac Counseling:  Patient advised that medication is irritating and drying.  Patient may need to apply sparingly and wash off after an hour before eventually leaving it on overnight.  The patient verbalized understanding of the proper use and possible adverse effects of tazorac.  All of the patient's questions and concerns were addressed.
Topical Sulfur Applications Counseling: Topical Sulfur Counseling: Patient counseled that this medication may cause skin irritation or allergic reactions.  In the event of skin irritation, the patient was advised to reduce the amount of the drug applied or use it less frequently.   The patient verbalized understanding of the proper use and possible adverse effects of topical sulfur application.  All of the patient's questions and concerns were addressed.
Dapsone Counseling: I discussed with the patient the risks of dapsone including but not limited to hemolytic anemia, agranulocytosis, rashes, methemoglobinemia, kidney failure, peripheral neuropathy, headaches, GI upset, and liver toxicity.  Patients who start dapsone require monitoring including baseline LFTs and weekly CBCs for the first month, then every month thereafter.  The patient verbalized understanding of the proper use and possible adverse effects of dapsone.  All of the patient's questions and concerns were addressed.
Benzoyl Peroxide Counseling: Patient counseled that medicine may cause skin irritation and bleach clothing.  In the event of skin irritation, the patient was advised to reduce the amount of the drug applied or use it less frequently.   The patient verbalized understanding of the proper use and possible adverse effects of benzoyl peroxide.  All of the patient's questions and concerns were addressed.
Bactrim Counseling:  I discussed with the patient the risks of sulfa antibiotics including but not limited to GI upset, allergic reaction, drug rash, diarrhea, dizziness, photosensitivity, and yeast infections.  Rarely, more serious reactions can occur including but not limited to aplastic anemia, agranulocytosis, methemoglobinemia, blood dyscrasias, liver or kidney failure, lung infiltrates or desquamative/blistering drug rashes.
High Dose Vitamin A Pregnancy And Lactation Text: High dose vitamin A therapy is contraindicated during pregnancy and breast feeding.
Topical Sulfur Applications Pregnancy And Lactation Text: This medication is Pregnancy Category C and has an unknown safety profile during pregnancy. It is unknown if this topical medication is excreted in breast milk.
Erythromycin Pregnancy And Lactation Text: This medication is Pregnancy Category B and is considered safe during pregnancy. It is also excreted in breast milk.
Detail Level: Simple

## 2024-05-10 ENCOUNTER — TELEPHONE (OUTPATIENT)
Dept: INTERNAL MEDICINE CLINIC | Facility: CLINIC | Age: 39
End: 2024-05-10

## 2024-05-10 NOTE — TELEPHONE ENCOUNTER
Verified name and .    Patient reports that she had injury to left foot above left heel.    She reports bruising and swelling. She states she is able to walk and move toes.    Patient was advised that she should be evaluated today, however, appointment cannot be made due to the fact that patient was \"dismissed for noncompliance\" due to multiple no show appointment and cancellations. This RN offered Ashley Batres's (Patient Experience Manager) phone number if she had further questions and she declined.    Patient was advised that she can go to the walk-in clinic and the immediate care.     She states she will go to immediate care today.

## (undated) NOTE — ED AVS SNAPSHOT
Chapo Garcia   MRN: [id number]    Department:  [facility] Emergency Department   Date of Visit:  10/11/2019           Disclosure     Insurance plans vary and the physician(s) referred by the ER may not be covered by your plan.  Please contact y CARE PHYSICIAN AT ONCE OR RETURN IMMEDIATELY TO THE EMERGENCY DEPARTMENT. If you have been prescribed any medication(s), please fill your prescription right away and begin taking the medication(s) as directed.   If you believe that any of the medications

## (undated) NOTE — LETTER
10/18/19        [facility]  [address] [address]      Dear Jus Sesay records indicate that you have outstanding lab work and or testing that was ordered for you and has not yet been completed:    CT Abdomen/Pelvis -- call [phone]

## (undated) NOTE — LETTER
8/29/2019          To Whom It May Concern:    Danish Romero is currently under my medical care and may not return to work at this time. Please excuse Madi Kirk for 1 days. She may return to work on 8-30-19. Activity is restricted as follows: none.     If

## (undated) NOTE — LETTER
[facility]  [address] [place], [place] [address]       08/30/19        Patient: Monica Rosenbaum   YOB: 1985   Date of Visit: 8/29/2019       Dear  Dr. Sherry Patel MD,      Thank you for referring Monica Rosenbaum to my practice.   Ple

## (undated) NOTE — ED AVS SNAPSHOT
Han Wilhelm   MRN: [id number]    Department:  [facility] Emergency Department   Date of Visit:  8/27/2019           Disclosure     Insurance plans vary and the physician(s) referred by the ER may not be covered by your plan.  Please contact yo CARE PHYSICIAN AT ONCE OR RETURN IMMEDIATELY TO THE EMERGENCY DEPARTMENT. If you have been prescribed any medication(s), please fill your prescription right away and begin taking the medication(s) as directed.   If you believe that any of the medications

## (undated) NOTE — MR AVS SNAPSHOT
After Visit Summary   2/24/2021    Ayden Snider    MRN: [id number]           Visit Information     Date & Time  2/24/2021  7:45 PM Provider  Shoaib Leos MD [address] [place] [address], [address] [place] [address],[address], [facility].  Phone  [phone]      Your [facility] now offers Video Visits through [facility] for adult and pediatric patients. Video Visits are available Monday - Friday for many common conditions such as allergies, colds, cough, fever, rash, sore throat, headache and pink eye.   The cost for a Video Vi [address]   Monday – Friday  4:00 pm – 10:00 pm   Saturday – Sunday  10:00 am – 4:00 pm  WALK-IN CARE  Emergency Medicine Providers  Conditions needing urgent attention, but are   non-life-threatening.     Also available by appointment Average cost  $120*

## (undated) NOTE — LETTER
1/23/2018          To Whom It May Concern:    Ivy Devine is currently under my medical care and may not return to work at this time. Please excuse Unknown Champagne for 1 day. She may return to work on 01/24/2018. Activity is restricted as follows: none.     I

## (undated) NOTE — MR AVS SNAPSHOT
[address] [facility] [address]  [phone]               Thank you for choosing us for your health care visit with Eliezer Boyd MD.  We are glad to serve you and happy to provide you with this summary of your visit. 8      5 mg         9      5 mg         10      5 mg         11      5 mg           12      5 mg         13      7.5 mg         14      5 mg         15      5 mg         16      5 mg         17      5 mg         18      5 mg           19      5 mg 2 sprays by Each Nare route daily. Commonly known as:  FLONASE           simvastatin 20 MG Tabs   TAKE 1 TABLET BY MOUTH EVERY NIGHT. Commonly known as:  ZOCOR           topiramate 100 MG Tabs   Take 100 mg by mouth 2 (two) times daily.    Commonly know walking, light jogging, cycling, swimming, etc.) for a goal of at least 150 minutes per week. Moderation of alcohol consumption Men: limit to <= 2 drinks* per day. Women and lighter weight persons: limit to <= 1 drink* per day.                       Heal

## (undated) NOTE — LETTER
Date & Time: 10/11/2019, 2:15 PM  Patient: Myriam Patterson  Encounter Provider(s):    Sender, Natalee Avina MD       To Whom It May Concern:    Myriam Patterson was seen and treated in our department on 10/11/2019. She can return to work on 10/15/19.     If you have

## (undated) NOTE — MR AVS SNAPSHOT
[address] [facility] [address]  [phone]               Thank you for choosing us for your health care visit with Ryan Chambers MD.  We are glad to serve you and happy to provide you with this summary of your visit. 19               20                 21               22               23   4.7   Hold   See details      24      2.5 mg         25      5 mg         26      5 mg         27      5 mg           28      5 mg         29      5 mg         30      2.5 mg Take 1 tablet (100 mg total) by mouth 2 (two) times daily. Commonly known as: Topamax           * Warfarin Sodium 1 MG Tabs   TAKE 1 TABLET BY MOUTH NIGHTLY.  SEE INSTRUCTIONS ON INR TABLE   Commonly known as:  COUMADIN           * Warfarin Sodium 5 MG T

## (undated) NOTE — LETTER
3/5/2020          To Whom It May Concern:    Bonifacio Wen is currently under my medical care and she can have her tooth extracted. If you require additional information please contact our office.         Sincerely,    Troy Albarado MD          Document generated by:  Troy Albarado